# Patient Record
Sex: FEMALE | Race: WHITE | NOT HISPANIC OR LATINO | Employment: FULL TIME | URBAN - METROPOLITAN AREA
[De-identification: names, ages, dates, MRNs, and addresses within clinical notes are randomized per-mention and may not be internally consistent; named-entity substitution may affect disease eponyms.]

---

## 2023-08-28 ENCOUNTER — OFFICE VISIT (OUTPATIENT)
Dept: PRIMARY CARE | Facility: CLINIC | Age: 33
End: 2023-08-28
Payer: COMMERCIAL

## 2023-08-28 ENCOUNTER — LAB (OUTPATIENT)
Dept: LAB | Facility: LAB | Age: 33
End: 2023-08-28
Payer: COMMERCIAL

## 2023-08-28 VITALS
HEIGHT: 66 IN | WEIGHT: 142 LBS | HEART RATE: 80 BPM | RESPIRATION RATE: 16 BRPM | SYSTOLIC BLOOD PRESSURE: 124 MMHG | OXYGEN SATURATION: 98 % | BODY MASS INDEX: 22.82 KG/M2 | DIASTOLIC BLOOD PRESSURE: 80 MMHG

## 2023-08-28 DIAGNOSIS — R42 DIZZY SPELLS: ICD-10-CM

## 2023-08-28 DIAGNOSIS — R42 DIZZY SPELLS: Primary | ICD-10-CM

## 2023-08-28 PROBLEM — M41.25 OTHER IDIOPATHIC SCOLIOSIS, THORACOLUMBAR REGION: Status: ACTIVE | Noted: 2023-08-28

## 2023-08-28 LAB
ALANINE AMINOTRANSFERASE (SGPT) (U/L) IN SER/PLAS: 11 U/L (ref 7–45)
ANION GAP IN SER/PLAS: 15 MMOL/L (ref 10–20)
ASPARTATE AMINOTRANSFERASE (SGOT) (U/L) IN SER/PLAS: 16 U/L (ref 9–39)
CALCIUM (MG/DL) IN SER/PLAS: 10 MG/DL (ref 8.6–10.6)
CARBON DIOXIDE, TOTAL (MMOL/L) IN SER/PLAS: 25 MMOL/L (ref 21–32)
CHLORIDE (MMOL/L) IN SER/PLAS: 104 MMOL/L (ref 98–107)
CREATININE (MG/DL) IN SER/PLAS: 0.61 MG/DL (ref 0.5–1.05)
ERYTHROCYTE DISTRIBUTION WIDTH (RATIO) BY AUTOMATED COUNT: 12.3 % (ref 11.5–14.5)
ERYTHROCYTE MEAN CORPUSCULAR HEMOGLOBIN CONCENTRATION (G/DL) BY AUTOMATED: 32.4 G/DL (ref 32–36)
ERYTHROCYTE MEAN CORPUSCULAR VOLUME (FL) BY AUTOMATED COUNT: 89 FL (ref 80–100)
ERYTHROCYTES (10*6/UL) IN BLOOD BY AUTOMATED COUNT: 4.72 X10E12/L (ref 4–5.2)
ESTIMATED AVERAGE GLUCOSE FOR HBA1C: 91 MG/DL
GFR FEMALE: >90 ML/MIN/1.73M2
GLUCOSE (MG/DL) IN SER/PLAS: 69 MG/DL (ref 74–99)
HEMATOCRIT (%) IN BLOOD BY AUTOMATED COUNT: 42 % (ref 36–46)
HEMOGLOBIN (G/DL) IN BLOOD: 13.6 G/DL (ref 12–16)
HEMOGLOBIN A1C/HEMOGLOBIN TOTAL IN BLOOD: 4.8 %
LEUKOCYTES (10*3/UL) IN BLOOD BY AUTOMATED COUNT: 10.5 X10E9/L (ref 4.4–11.3)
NRBC (PER 100 WBCS) BY AUTOMATED COUNT: 0 /100 WBC (ref 0–0)
PLATELETS (10*3/UL) IN BLOOD AUTOMATED COUNT: 227 X10E9/L (ref 150–450)
POTASSIUM (MMOL/L) IN SER/PLAS: 3.9 MMOL/L (ref 3.5–5.3)
SODIUM (MMOL/L) IN SER/PLAS: 140 MMOL/L (ref 136–145)
THYROTROPIN (MIU/L) IN SER/PLAS BY DETECTION LIMIT <= 0.05 MIU/L: 2.07 MIU/L (ref 0.44–3.98)
UREA NITROGEN (MG/DL) IN SER/PLAS: 12 MG/DL (ref 6–23)

## 2023-08-28 PROCEDURE — 1036F TOBACCO NON-USER: CPT | Performed by: FAMILY MEDICINE

## 2023-08-28 PROCEDURE — 84450 TRANSFERASE (AST) (SGOT): CPT

## 2023-08-28 PROCEDURE — 84443 ASSAY THYROID STIM HORMONE: CPT

## 2023-08-28 PROCEDURE — 99203 OFFICE O/P NEW LOW 30 MIN: CPT | Performed by: FAMILY MEDICINE

## 2023-08-28 PROCEDURE — 80048 BASIC METABOLIC PNL TOTAL CA: CPT

## 2023-08-28 PROCEDURE — 85027 COMPLETE CBC AUTOMATED: CPT

## 2023-08-28 PROCEDURE — 83036 HEMOGLOBIN GLYCOSYLATED A1C: CPT

## 2023-08-28 PROCEDURE — 93000 ELECTROCARDIOGRAM COMPLETE: CPT | Performed by: FAMILY MEDICINE

## 2023-08-28 PROCEDURE — 36415 COLL VENOUS BLD VENIPUNCTURE: CPT

## 2023-08-28 PROCEDURE — 84460 ALANINE AMINO (ALT) (SGPT): CPT

## 2023-08-28 NOTE — PROGRESS NOTES
"Subjective   Patient ID: Olga Christopher is a 33 y.o. female who presents for Dizziness.    HPI  Initial visit.    Reports dizzy spells x 2 months (last episode 1.5 wks ago).  Gets sensation in middle of forehead then down her nose (almost feels like she's going to have a nosebleed) then the dizziness occurs.  Feels like the room is spinning.  Asso w/palpitations, nausea (emesis x 1).  No chest pain, SOB.  Can occur a couple times/day for 2-3 days, then won't occur for a few days.   Had approx 30-40 episodes over the last 2 months.  Not orthostatic, but normally occurs when already standing.  Lasts a few seconds.  Resolves when she sits down.  Had 1 episode while driving.  Not precipitated by turning her head.  Drinks 64-80 oz water/day, 1-2 cans Pepsi/day.  No coffee, tea, alcohol.    Review of Systems  No other complaints.     Objective   /80   Pulse 80   Resp 16   Ht 1.676 m (5' 6\")   Wt 64.4 kg (142 lb)   SpO2 98%   BMI 22.92 kg/m²     Physical Exam  Constitutional:       General: She is not in acute distress.     Appearance: She is normal weight.   HENT:      Head: Normocephalic.      Right Ear: Tympanic membrane normal.      Left Ear: Tympanic membrane normal.      Mouth/Throat:      Pharynx: Oropharynx is clear. No oropharyngeal exudate or posterior oropharyngeal erythema.   Eyes:      Extraocular Movements: Extraocular movements intact.      Conjunctiva/sclera: Conjunctivae normal.      Pupils: Pupils are equal, round, and reactive to light.   Neck:      Vascular: No carotid bruit.   Cardiovascular:      Rate and Rhythm: Normal rate.      Heart sounds: Normal heart sounds. No murmur heard.     No friction rub. No gallop.      Comments: I personally reviewed EKG at time of office visit  Pulmonary:      Effort: Pulmonary effort is normal.      Breath sounds: Normal breath sounds. No wheezing, rhonchi or rales.   Skin:     Coloration: Skin is not jaundiced or pale.   Neurological:      General: No " focal deficit present.      Mental Status: She is oriented to person, place, and time.   Psychiatric:         Mood and Affect: Mood normal.     Assessment/Plan   Diagnoses and all orders for this visit:  Dizzy spells  -     CBC; Future  -     Basic Metabolic Panel; Future  -     Alanine Aminotransferase; Future  -     Aspartate Aminotransferase; Future  -     TSH with reflex to Free T4 if abnormal; Future  -     ECG 12 lead (Clinic Performed)  -     Hemoglobin A1C; Future  -     Transthoracic Echo (TTE) Complete; Future  -     Referral to Neurology; Future  -     Referral to Cardiology; Future    Discussed DDx including cardiac etiologies vs neurological etiologies vs metabolic etiologies.  Nonfasting labs.  ECHO ordered.  Referred to neurology and cardiology.  Recommend ER visit if symptoms return.    Schedule annual wellness visit.

## 2023-08-28 NOTE — PATIENT INSTRUCTIONS
Discussed Ddx including cardiac etiologies vs neurological etiologies vs metabolic etiologies.  Nonfasting labs.  ECHO ordered.  Referred to neurology and cardiology.  Recommend ER visit if symptoms return.    Schedule annual wellness visit.

## 2023-12-04 ENCOUNTER — APPOINTMENT (OUTPATIENT)
Dept: NEUROLOGY | Facility: CLINIC | Age: 33
End: 2023-12-04
Payer: COMMERCIAL

## 2024-01-23 ENCOUNTER — APPOINTMENT (OUTPATIENT)
Dept: NEUROLOGY | Facility: CLINIC | Age: 34
End: 2024-01-23
Payer: COMMERCIAL

## 2024-01-26 ENCOUNTER — HOSPITAL ENCOUNTER (EMERGENCY)
Facility: HOSPITAL | Age: 34
Discharge: HOME | End: 2024-01-26
Attending: EMERGENCY MEDICINE
Payer: COMMERCIAL

## 2024-01-26 ENCOUNTER — APPOINTMENT (OUTPATIENT)
Dept: CARDIOLOGY | Facility: HOSPITAL | Age: 34
End: 2024-01-26
Payer: COMMERCIAL

## 2024-01-26 ENCOUNTER — APPOINTMENT (OUTPATIENT)
Dept: RADIOLOGY | Facility: HOSPITAL | Age: 34
End: 2024-01-26
Payer: COMMERCIAL

## 2024-01-26 VITALS
SYSTOLIC BLOOD PRESSURE: 152 MMHG | TEMPERATURE: 97.7 F | RESPIRATION RATE: 18 BRPM | DIASTOLIC BLOOD PRESSURE: 94 MMHG | OXYGEN SATURATION: 99 % | HEART RATE: 85 BPM

## 2024-01-26 DIAGNOSIS — R42 DIZZINESS: Primary | ICD-10-CM

## 2024-01-26 LAB
ALBUMIN SERPL BCP-MCNC: 4.2 G/DL (ref 3.4–5)
ALP SERPL-CCNC: 58 U/L (ref 33–110)
ALT SERPL W P-5'-P-CCNC: 12 U/L (ref 7–45)
ANION GAP SERPL CALC-SCNC: 11 MMOL/L (ref 10–20)
AST SERPL W P-5'-P-CCNC: 15 U/L (ref 9–39)
BASOPHILS # BLD AUTO: 0.04 X10*3/UL (ref 0–0.1)
BASOPHILS NFR BLD AUTO: 0.5 %
BILIRUB SERPL-MCNC: 0.3 MG/DL (ref 0–1.2)
BUN SERPL-MCNC: 15 MG/DL (ref 6–23)
CALCIUM SERPL-MCNC: 9.5 MG/DL (ref 8.6–10.3)
CHLORIDE SERPL-SCNC: 107 MMOL/L (ref 98–107)
CO2 SERPL-SCNC: 27 MMOL/L (ref 21–32)
CREAT SERPL-MCNC: 0.64 MG/DL (ref 0.5–1.05)
EGFRCR SERPLBLD CKD-EPI 2021: >90 ML/MIN/1.73M*2
EOSINOPHIL # BLD AUTO: 0.17 X10*3/UL (ref 0–0.7)
EOSINOPHIL NFR BLD AUTO: 2.3 %
ERYTHROCYTE [DISTWIDTH] IN BLOOD BY AUTOMATED COUNT: 12.4 % (ref 11.5–14.5)
GLUCOSE SERPL-MCNC: 86 MG/DL (ref 74–99)
HCT VFR BLD AUTO: 38 % (ref 36–46)
HGB BLD-MCNC: 12.8 G/DL (ref 12–16)
IMM GRANULOCYTES # BLD AUTO: 0.02 X10*3/UL (ref 0–0.7)
IMM GRANULOCYTES NFR BLD AUTO: 0.3 % (ref 0–0.9)
INR PPP: 1.1 (ref 0.9–1.1)
LYMPHOCYTES # BLD AUTO: 2.21 X10*3/UL (ref 1.2–4.8)
LYMPHOCYTES NFR BLD AUTO: 30.2 %
MCH RBC QN AUTO: 29.3 PG (ref 26–34)
MCHC RBC AUTO-ENTMCNC: 33.7 G/DL (ref 32–36)
MCV RBC AUTO: 87 FL (ref 80–100)
MONOCYTES # BLD AUTO: 0.44 X10*3/UL (ref 0.1–1)
MONOCYTES NFR BLD AUTO: 6 %
NEUTROPHILS # BLD AUTO: 4.44 X10*3/UL (ref 1.2–7.7)
NEUTROPHILS NFR BLD AUTO: 60.7 %
NRBC BLD-RTO: 0 /100 WBCS (ref 0–0)
PLATELET # BLD AUTO: 217 X10*3/UL (ref 150–450)
POTASSIUM SERPL-SCNC: 3.6 MMOL/L (ref 3.5–5.3)
PROT SERPL-MCNC: 6.9 G/DL (ref 6.4–8.2)
PROTHROMBIN TIME: 12.5 SECONDS (ref 9.8–12.8)
RBC # BLD AUTO: 4.37 X10*6/UL (ref 4–5.2)
SODIUM SERPL-SCNC: 141 MMOL/L (ref 136–145)
WBC # BLD AUTO: 7.3 X10*3/UL (ref 4.4–11.3)

## 2024-01-26 PROCEDURE — 80053 COMPREHEN METABOLIC PANEL: CPT | Performed by: EMERGENCY MEDICINE

## 2024-01-26 PROCEDURE — 96361 HYDRATE IV INFUSION ADD-ON: CPT

## 2024-01-26 PROCEDURE — 85025 COMPLETE CBC W/AUTO DIFF WBC: CPT | Performed by: EMERGENCY MEDICINE

## 2024-01-26 PROCEDURE — 36415 COLL VENOUS BLD VENIPUNCTURE: CPT | Performed by: EMERGENCY MEDICINE

## 2024-01-26 PROCEDURE — 85610 PROTHROMBIN TIME: CPT | Performed by: EMERGENCY MEDICINE

## 2024-01-26 PROCEDURE — 70450 CT HEAD/BRAIN W/O DYE: CPT | Performed by: RADIOLOGY

## 2024-01-26 PROCEDURE — 93005 ELECTROCARDIOGRAM TRACING: CPT

## 2024-01-26 PROCEDURE — 2500000004 HC RX 250 GENERAL PHARMACY W/ HCPCS (ALT 636 FOR OP/ED): Performed by: EMERGENCY MEDICINE

## 2024-01-26 PROCEDURE — 70450 CT HEAD/BRAIN W/O DYE: CPT

## 2024-01-26 PROCEDURE — 96360 HYDRATION IV INFUSION INIT: CPT

## 2024-01-26 PROCEDURE — 99285 EMERGENCY DEPT VISIT HI MDM: CPT

## 2024-01-26 PROCEDURE — 99284 EMERGENCY DEPT VISIT MOD MDM: CPT | Performed by: EMERGENCY MEDICINE

## 2024-01-26 RX ORDER — ONDANSETRON HYDROCHLORIDE 2 MG/ML
4 INJECTION, SOLUTION INTRAVENOUS ONCE
Status: DISCONTINUED | OUTPATIENT
Start: 2024-01-26 | End: 2024-01-27 | Stop reason: HOSPADM

## 2024-01-26 RX ADMIN — SODIUM CHLORIDE, POTASSIUM CHLORIDE, SODIUM LACTATE AND CALCIUM CHLORIDE 1000 ML: 600; 310; 30; 20 INJECTION, SOLUTION INTRAVENOUS at 20:29

## 2024-01-26 ASSESSMENT — COLUMBIA-SUICIDE SEVERITY RATING SCALE - C-SSRS
2. HAVE YOU ACTUALLY HAD ANY THOUGHTS OF KILLING YOURSELF?: NO
6. HAVE YOU EVER DONE ANYTHING, STARTED TO DO ANYTHING, OR PREPARED TO DO ANYTHING TO END YOUR LIFE?: NO
1. IN THE PAST MONTH, HAVE YOU WISHED YOU WERE DEAD OR WISHED YOU COULD GO TO SLEEP AND NOT WAKE UP?: NO

## 2024-01-26 ASSESSMENT — PAIN SCALES - GENERAL: PAINLEVEL_OUTOF10: 0 - NO PAIN

## 2024-01-26 ASSESSMENT — PAIN - FUNCTIONAL ASSESSMENT: PAIN_FUNCTIONAL_ASSESSMENT: 0-10

## 2024-01-26 NOTE — ED TRIAGE NOTES
Pt arrives via triage with complaint of intermittent dizziness, nausea, and lethargy happening over the last couple of months. Denies CP/SOB, st was seen at PCP and referred pt to neurologist but pt cannot get into an appt.

## 2024-01-27 NOTE — SIGNIFICANT EVENT
Called late evening on 1/26/24 by Dr. Herrera regarding Ms. Christopher. I did not see or examine the patient. History obtained from ED provider. Briefly, she is a 34 y/o woman who presented with a 6 month history of dizziness. Described as vertigo, with room spinning sensation, lasting seconds to minutes with spontaneous resolution. Usually with head movement. Sometimes multiple episodes per day. Sometimes with 2 weeks or so of freedom. No associated focal weakness or numbness, headache or neck pain. The patients symptoms had resolved and she is back to her neurologic baseline.     CTH without any evidence of acute intracranial pathology. I was informed that she cannot get an MRI Brain as she has hardware that is not compatible. I told Dr. Herrera that the patient could either stay until I may examine her in the AM, or, given the above, she could follow up with Neurology and Vestibular Rehab if Dr. Herrera deemed safe for discharge. Patient requested to follow up with Neurology as outpatient.     Idris Hurst, DO  Neurology

## 2024-01-27 NOTE — ED PROVIDER NOTES
History/Exam limitations: none.   Additional history was obtained from patient and spouse/SO.          HPI:    Olga Christopher is a 33 y.o. female no significant past medical history presenting with episodic dizziness times approximately 6 months.  Patient states that she had an episode today while riding in a car and she states she felt markedly dizzy and was unable to open her eyes.  States she was still talking through the episode and had no loss conscious.  States that she feels as if the room is moving when the episodes occur.  States that she feels that she is going to fall over as well and has to sit down.  States that they last anywhere from a few seconds to a minute.  States that she has sometimes multiple per day and sometimes she will go a week or 2 without an episode.  She has not been able to find any triggers.  Denies taking any medications.  Has been nauseous and had vomiting with episodes but does not always.  Had an episode while was evaluating her in the ED and she retain consciousness, had no nystagmus, no discoordination.  It resolved after approximate minute.  Heart rate was normal in approximately the 60s to 70s during the episode.  Patient denies any chest pain, shortness breath or palpitations during the episodes.  No focal weakness or numbness.  Has not noticed any clear discoordination.  Denies any family history of any neurologic conditions.  No headache or neck pain.  Episodes are not positional.    Patient states she is unable to get an MRI due to having hardware in her back from her prior spine surgery that is not compatible          Physical Exam:  ED Triage Vitals   Temperature Heart Rate Respirations BP   01/26/24 1743 01/26/24 1743 01/26/24 1743 01/26/24 1743   36.5 °C (97.7 °F) 85 18 (!) 152/94      Pulse Ox Temp src Heart Rate Source Patient Position   01/26/24 1744 -- -- --   99 %         BP Location FiO2 (%)     -- --              GEN:      Alert, NAD  Eyes:       PERRL,  EOMI  HENT:      NC/AT, OP clear, airway patent, MM  CV:      RRR, no MRG, no LE pitting edema, 2+ radial and pedal pulses  PULM:     CTAB, no w/r/r, easy WOB, symmetric chest rise  ABD:      Soft, NT, ND, no masses, BS +  :       No CVA TTP  NEURO:   A/Ox4, CN II-XII intact, strength 5/5 in all 4 ext, SILT, FNF normal b/l, no pronator drift, no nystagmus, negative test of skew, no truncal instability, heel shin normal bilaterally  MSK:      FROM, no joint deformities or swelling, no e/o trauma  SKIN:       Warm and dry  PSYCH:    Appropriate mood and affect         MDM/ED Course:   Olga Christopher is a 33 y.o. female no significant past medical history presenting with episodic dizziness times approximately 6 months.  Vitals and exam as documented above.  Patient is currently free of dizziness therefore hints exam was not performed.  Patient has had episodic symptoms over the last 6 months of varying frequency and intensity.  Unclear etiology for symptoms, differential includes peripheral vertigo, intracranial mass, posterior CVA (less likely given time course, recurrent symptoms, history, risk factors), focal seizure, atypical migraine, demyelinating process.  Differential includes arrhythmia, vasovagal episodes.  Symptoms are not related to position change and patient is not clinically orthostatic on exam.  Patient denies any tinnitus or other hearing symptoms.  IV was placed and labs drawn.  Patient was given a liter of IV fluids.  CBC CMP and coags reassuring.  CT head was obtained without acute findings.  While assessing patient she did have an episode that lasted approximately 1 minute.  As above patient not tachycardic and had no discernible neurologic exam findings.  Consulted Dr. Hurst from neurology regarding patient and recommends outpatient follow-up with neurology.  MRI would be a consideration however patient unable to obtain 1 as above.  Discussed whether vessel imaging would be helpful at this time  and recommended deferring.  Recommend continued monitoring of symptoms, outpatient follow-up with neurology, possible vestibular rehab.  Patient states she is been trying to get an appointment with neurology but has been canceled as she was told that dizziness was not a reason for an appointment.  A new consult order request was placed.  Patient feels comfortable with this plan.  Patient was explained findings, exam/study results, the importance of follow up and the plan moving forward; patient verbalized understanding and agreement. All questions were answered and no new questions at this time. Patient will be discharged with strict return precautions, follow up plan with PCP/neurology.     You El: 5:43 PM normal sinus rhythm, sinus arrhythmia, rate 76, normal axis, normal intervals, no STEMI, no ectopy, no prior for comparison, no signs of Brugada/WPW/epsilon wave/prolonged QTc.    Diagnoses as of 01/29/24 1406   Dizziness         Chronic medical conditions affecting care listed in MDM. I independently reviewed imaging studies and agreed with radiology reads. I reviewed recent and relevant outside records including PCP notes, Prior discharge summaries, and prior radiology reports.    Procedure  Procedures    Diagnosis:   1.  Episodic dizziness    Dispo: Discharged in stable condition      Disclaimer: Portions of this note were dictated by speech recognition. An attempt at proof reading was made to minimize errors. Minor errors in transcription may be present.  Please call if questions.     Piter Herrera MD  01/29/24 1406

## 2024-01-27 NOTE — DISCHARGE INSTRUCTIONS
You are seen emergency room today for evaluation of episodic dizziness over the last 6 months.  Your CT scan and labs were overall reassuring.  Your exam was reassuring as well.  Please follow-up with neurology as discussed.  Please also follow-up with your primary care provider.  Please return for worsening symptoms including limited to worsening dizziness, any weakness, numbness, difficulty with coordination, headache, neck pain, or if you have any other concerns.

## 2024-01-29 LAB
ATRIAL RATE: 76 BPM
P AXIS: 49 DEGREES
P OFFSET: 197 MS
P ONSET: 153 MS
PR INTERVAL: 138 MS
Q ONSET: 222 MS
QRS COUNT: 12 BEATS
QRS DURATION: 72 MS
QT INTERVAL: 380 MS
QTC CALCULATION(BAZETT): 427 MS
QTC FREDERICIA: 411 MS
R AXIS: 31 DEGREES
T AXIS: 45 DEGREES
T OFFSET: 412 MS
VENTRICULAR RATE: 76 BPM

## 2024-02-01 ENCOUNTER — OFFICE VISIT (OUTPATIENT)
Dept: NEUROLOGY | Facility: CLINIC | Age: 34
End: 2024-02-01
Payer: COMMERCIAL

## 2024-02-01 VITALS
WEIGHT: 153.2 LBS | SYSTOLIC BLOOD PRESSURE: 134 MMHG | BODY MASS INDEX: 24.73 KG/M2 | HEART RATE: 76 BPM | DIASTOLIC BLOOD PRESSURE: 93 MMHG

## 2024-02-01 DIAGNOSIS — R42 VERTIGO: Primary | ICD-10-CM

## 2024-02-01 PROCEDURE — 99205 OFFICE O/P NEW HI 60 MIN: CPT | Performed by: PSYCHIATRY & NEUROLOGY

## 2024-02-01 PROCEDURE — 1036F TOBACCO NON-USER: CPT | Performed by: PSYCHIATRY & NEUROLOGY

## 2024-02-01 NOTE — PROGRESS NOTES
Consultation:  Krystina     Olga Christopher is a 33 y.o. year old female here for a new visit for dizziness.  Referred by Piter Herrera.     HPI  She had several vertigo spells since August.  She went to the ED  for a severe episode.    She was riding in a car on  and felt very dizzy , room spinning sensation.    She has had intermittent vertigo.  Sometimes nausea and vomiting.   She feels everything around her is spinning.  No trigger. No lightheadedness.   No headaches.  No double vision.   No tinnitus , no ear pain.   FH: father has heart issues, mother has breast cancer. Father has DM.   Has trouble with sweats.   No weight changes.  No palpitations.  No head injuries.  Has not seen ENT.   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  Last week - Lost vision in r eye x 20 minutes then went away.  FH: sister has migraines  Her and her  are trying to have a baby.  Review of Systems    Patient Active Problem List   Diagnosis    Other idiopathic scoliosis, thoracolumbar region     Past Medical History:   Diagnosis Date    Complete or unspecified spontaneous  without complication 2020    , spontaneous complete    Encounter for  screening, unspecified 2021    Encounter for  screening    Encounter for pregnancy test, result positive 2021    Pregnancy test positive    Encounter for screening for infections with a predominantly sexual mode of transmission 02/15/2021    Screening for STD (sexually transmitted disease)    Encounter for supervision of normal pregnancy, unspecified, third trimester 2021    Third trimester pregnancy    Encounter for supervision of other normal pregnancy, first trimester 2021    Multigravida in first trimester    Encounter for supervision of other normal pregnancy, second trimester 06/10/2021    Multigravida in second trimester    Encounter for supervision of other normal pregnancy,  third trimester 2021    Multigravida in third trimester    Maternal care for other known or suspected poor fetal growth, unspecified trimester, not applicable or unspecified 2021    SGA (small for gestational age), fetal, affecting care of mother, antepartum     small for gestational age, unspecified weight 2021    SGA (small for gestational age)    Other specified abnormal findings of blood chemistry 2020    Abnormal thyroid screen (blood)    Other specified pregnancy related conditions, unspecified trimester 2021    Pain of round ligament during pregnancy    Pelvic and perineal pain 02/15/2021    Pelvic pain in female    Personal history of other complications of pregnancy, childbirth and the puerperium 2021    H/O: 1 miscarriage    Personal history of other diseases of the female genital tract 02/15/2021    History of vaginal discharge    Personal history of other diseases of the musculoskeletal system and connective tissue     History of scoliosis    Pregnancy with inconclusive fetal viability, not applicable or unspecified 2021    Encounter to determine fetal viability of pregnancy     Past Surgical History:   Procedure Laterality Date    SPINE SURGERY  2017    Scoliosis     Social History     Tobacco Use    Smoking status: Never    Smokeless tobacco: Never   Substance Use Topics    Alcohol use: Not Currently     family history includes Breast cancer in her mother; Diabetes in her father; Hypertension in her father; Stroke in her father; Valvular heart disease in her father.  No current outpatient medications on file.  No Known Allergies  BP (!) 134/93   Pulse 76   Wt 69.5 kg (153 lb 3.2 oz)   LMP 2024   BMI 24.73 kg/m²   Neurological Exam/Physical Exam:    Constitutional: General appearance: no acute distress. Pleasant.   Auscultation of Heart: Regular rate and rhythm, no murmurs, normal S1 and S2.   Carotid Arteries: no bruits  Peripheral  Vascular Exam: No swelling, edema or tenderness to palpation in extremities  Mental status: no distress, alert, interactive and cooperative. Affect is appropriate.   Orientation: oriented to person, oriented to place and oriented to time.   Memory: recent memory intact and remote memory intact.   Attention: normal attention /concentration.   Language: normal comprehension and naming.   Fund of knowledge: Patient displays adequate knowledge of current events.  Eyes: The ophthalmoscopic examination was normal.   Cranial nerve II: Visual fields full to confrontation.   Cranial nerves III, IV, and VI: Pupils round, equally reactive to light; EOMs intact. No nystagmus.   Cranial Nerve V: Facial sensation intact to LT bilaterally.   Cranial nerve VII: no facial droop  Cranial nerve VIII: Hearing is intact  Cranial nerves IX and X: Palate elevates symmetrically.   Cranial nerve XI: Shoulder shrug intact.   Cranial nerve XII: Tongue is midline.  Motor:  Muscle bulk was normal in both upper and lower extremities.    No atrophy.   Strength is normal.   Deep Tendon Reflexes: left biceps 2+ , right biceps 2+, left triceps 2+, right triceps 2+, left brachioradialis 2+, right brachioradialis 2+, left patella 2+, right patella 2+, left ankle jerk 2+, right ankle jerk 2+   Plantar Reflex: Toes downgoing to plantar stimulation on the left. Toes downgoing to plantar stimulation on the right.   Sensory Exam: Normal to vibratory sensation  Coordination:  no limb dystaxia and rapid alternating movements are intact.   Gait:  cautious.         Labs:  CBC:   Lab Results   Component Value Date    WBC 7.3 01/26/2024    HGB 12.8 01/26/2024    HCT 38.0 01/26/2024     01/26/2024     BMP:   Lab Results   Component Value Date     01/26/2024    K 3.6 01/26/2024     01/26/2024    CO2 27 01/26/2024    BUN 15 01/26/2024    CREATININE 0.64 01/26/2024    CALCIUM 9.5 01/26/2024     LFT:   Lab Results   Component Value Date    ALKPHOS  58 01/26/2024    BILITOT 0.3 01/26/2024    PROT 6.9 01/26/2024    ALBUMIN 4.2 01/26/2024    ALT 12 01/26/2024    AST 15 01/26/2024       CT head 1/26 normal.     Assessment/Plan   Problem List Items Addressed This Visit    None   Vertigo - ? Vertiginous migraine vs inner ear vs demyelinating disease vs aneurysm. Does not appear to be dysautonomia / vasovagal but is on differential.   Cannot have MRI brain due to metal duncan in spine.  Obtain cta head and neck.  Vestibular therapy.  Keep log of these. Trial riboflavin/ magnesium supplements.  See ENT to rule out any peripheral cause. Obtain ENG testing.

## 2024-02-01 NOTE — PATIENT INSTRUCTIONS
"It was a pleasure seeing you today.     I want you to get a CT angio head and neck- Please call radiology to schedule at [phone].   If the results are abnormal, I will call you to discuss.    I want you to get ENG testing , referral to ENT and vestibular rehab.     There are good alternative medication and over-the-counter strategies to help migraines:     You can try riboflavin (vitamin B2) 200 mg twice a day as a natural migraine preventive. This may take several weeks to become fully effective.  You can try magnesium 400 mg once daily as a natural migraine preventive. If it causes loose bowel movements, reduce the dose to 200 mg.    For future appointments I recommend you schedule a follow-up appointment with general neurology.    For any urgent issues or needing to speak to a medical assistant please call [phone], option 6 during our office hours Monday-Friday 8am-4pm, and leave a voicemail with your concern.  My office will try to reach back you as soon as possible within 24 (business) hours.  If you have an emergency please call 911 or visit a local urgent care or nearest emergency room.      Please understand that [employer] is a useful communication tool for simple \"normal\" results or a refill request but I would not recommend using this tool for emergent or urgent issues or for conversations with me.  I am happy to ask my staff to rearrange a follow up visit or a virtual visit sooner than requested if appropriate for your care.     "

## 2024-02-12 ENCOUNTER — APPOINTMENT (OUTPATIENT)
Dept: PRIMARY CARE | Facility: CLINIC | Age: 34
End: 2024-02-12
Payer: COMMERCIAL

## 2024-02-15 ENCOUNTER — APPOINTMENT (OUTPATIENT)
Dept: RADIOLOGY | Facility: CLINIC | Age: 34
End: 2024-02-15
Payer: COMMERCIAL

## 2024-02-19 ENCOUNTER — LAB (OUTPATIENT)
Dept: LAB | Facility: LAB | Age: 34
End: 2024-02-19
Payer: COMMERCIAL

## 2024-02-19 DIAGNOSIS — R42 VERTIGO: ICD-10-CM

## 2024-02-19 LAB — HCG UR QL IA.RAPID: POSITIVE

## 2024-02-19 PROCEDURE — 81025 URINE PREGNANCY TEST: CPT

## 2024-02-20 ENCOUNTER — TELEPHONE (OUTPATIENT)
Dept: OBSTETRICS AND GYNECOLOGY | Facility: CLINIC | Age: 34
End: 2024-02-20
Payer: COMMERCIAL

## 2024-02-20 DIAGNOSIS — O36.80X0 PREGNANCY WITH INCONCLUSIVE FETAL VIABILITY (HHS-HCC): ICD-10-CM

## 2024-02-21 NOTE — TELEPHONE ENCOUNTER
Patient called back and wanted to inform you that she has had miscarriages in the past, and that she is also RH neg. I let her know that you will be reaching out to her if not this week then next week to touch base with her.

## 2024-02-22 ENCOUNTER — OFFICE VISIT (OUTPATIENT)
Dept: PRIMARY CARE | Facility: CLINIC | Age: 34
End: 2024-02-22
Payer: COMMERCIAL

## 2024-02-22 ENCOUNTER — APPOINTMENT (OUTPATIENT)
Dept: RADIOLOGY | Facility: CLINIC | Age: 34
End: 2024-02-22
Payer: COMMERCIAL

## 2024-02-22 VITALS
DIASTOLIC BLOOD PRESSURE: 80 MMHG | SYSTOLIC BLOOD PRESSURE: 132 MMHG | WEIGHT: 155.6 LBS | HEIGHT: 66 IN | BODY MASS INDEX: 25.01 KG/M2 | TEMPERATURE: 97.6 F | HEART RATE: 85 BPM | OXYGEN SATURATION: 97 % | RESPIRATION RATE: 16 BRPM

## 2024-02-22 DIAGNOSIS — R42 DIZZY SPELLS: Primary | ICD-10-CM

## 2024-02-22 PROCEDURE — 1036F TOBACCO NON-USER: CPT | Performed by: FAMILY MEDICINE

## 2024-02-22 PROCEDURE — 99213 OFFICE O/P EST LOW 20 MIN: CPT | Performed by: FAMILY MEDICINE

## 2024-02-22 NOTE — PROGRESS NOTES
"Subjective   Patient ID: Olga Christopher is a 33 y.o. female who presents for Hospital Follow-up (ED visit ).    HPI   Seen at ER 1/26/24 for intermittent dizziness x 6 months.  IVF provided, labs obtained, CT head obtained.  Advised to f/u w/neuro.  Saw neuro 2/1/24.  CTA head/neck ordered (scheduled for today but had to cancel because she found out that she is pregnant, has upcoming appt w/OB).  Referred to vestibular therapy.  Advised to see ENT to r/o peripheral causes (has appt 3/14/24).  Had 3 episodes of dizziness (all in the same day) since neuro visit.    Otherwise no complaints.     Review of Systems  No other complaints.     Objective   /80   Pulse 85   Temp 36.4 °C (97.6 °F)   Resp 16   Ht 1.676 m (5' 6\")   Wt 70.6 kg (155 lb 9.6 oz)   LMP 01/25/2024   SpO2 97%   BMI 25.11 kg/m²     Physical Exam  Constitutional:       General: She is not in acute distress.     Appearance: Normal appearance.   Neurological:      Mental Status: She is oriented to person, place, and time.   Psychiatric:         Mood and Affect: Mood normal.         Behavior: Behavior normal.     Assessment/Plan   Diagnoses and all orders for this visit:  Dizzy spells    Follow up with specialists and vestibular therapy as directed.    Schedule annual wellness visit.   "

## 2024-02-22 NOTE — PATIENT INSTRUCTIONS
Follow up with specialists and vestibular therapy as directed.    Schedule annual wellness visit.

## 2024-02-27 NOTE — TELEPHONE ENCOUNTER
Spoke with patient. . With LMP 24. She found out she was pregnant when she was going to get a CT scan and had a positive pregnancy test. Patient aware of [facility] delivery location. Discussed the use of Rhogam during pregnancy and when it is given. Will order OB ultrasound and office will call and schedule appointment with patient.

## 2024-02-29 ENCOUNTER — CLINICAL SUPPORT (OUTPATIENT)
Dept: AUDIOLOGY | Facility: CLINIC | Age: 34
End: 2024-02-29
Payer: COMMERCIAL

## 2024-02-29 DIAGNOSIS — R42 DIZZINESS: Primary | ICD-10-CM

## 2024-02-29 DIAGNOSIS — R42 VERTIGO: ICD-10-CM

## 2024-02-29 PROCEDURE — 92700 UNLISTED ORL SERVICE/PX: CPT | Mod: 59

## 2024-02-29 PROCEDURE — 92537 CALORIC VSTBLR TEST W/REC: CPT

## 2024-02-29 PROCEDURE — 92540 BASIC VESTIBULAR EVALUATION: CPT

## 2024-02-29 PROCEDURE — 92550 TYMPANOMETRY & REFLEX THRESH: CPT

## 2024-02-29 PROCEDURE — 92519 VEMP TST I&R CERVICAL&OCULAR: CPT

## 2024-02-29 PROCEDURE — 92557 COMPREHENSIVE HEARING TEST: CPT

## 2024-02-29 NOTE — PROGRESS NOTES
AUDIOMETRIC EVALUATION      Name: Olga Christopher  : 1990  Age: 33 y.o.  Date of Evaluation: 2024  Time: 2907-3158    HISTORY     Olga Christopher, age 33, was seen today for an initial hearing evaluation in conjunction with a balance functional test. Please see same day VNG note for full vestibular case history. Audiologically, Olga reported that she hears well and has no present concerns for her hearing. She denied any ear pain, pressure, fullness, tinnitus, ear infection or drainage, or ear trauma.    PROCEDURE     Otoscopy:   Right Ear: Clear ear canal with visible tympanic membrane.  Left Ear: Clear ear canal with visible tympanic membrane.    Tympanometry: 226 Hz probe tone  Right Ear: Tympanometry revealed normal ear canal volume, peak pressure, and compliance, consistent with normal middle ear function (Type A).  Left Ear: Tympanometry revealed normal ear canal volume, peak pressure, and compliance, consistent with normal middle ear function (Type A).     Ipsilateral Acoustic Reflexes:   Right Ear: Ipsilateral Acoustic Reflexes were present 500-4000 Hz.  Left Ear: Ipsilateral Acoustic Reflexes were present 500-4000 Hz.    Pure Tone Audiometry: Conventional Audiometry via TDH headphones with good reliability  Right Ear: Normal hearing sensitivity at all tested frequencies from 125- 8000 Hz.   Left Ear: Normal hearing sensitivity at all tested frequencies from 125- 8000 Hz.     Speech Audiometry:   Right Ear:    Speech reception threshold: 5 dB HL  Word Recognition Score was 100% when presented at 55 dB HL. Recorded NU6 list utilized.   Left Ear:   Speech reception threshold: 5 dB HL  Word Recognition Score was 100% when presented at  55 dB HL. Recorded NU6 list utilized.     IMPRESSIONS AND RECOMMENDATIONS      Results were discussed with patient. Results indicate bilateral mobile and intact tympanic membranes, normal hearing in the right ear, and normal hearing in the left ear.     Please see full  impressions and recommendations under same day VNG note.     TREATMENT PLAN     Retest hearing if concerns arise.  Follow up with medical providers as indicated.      JAZZ Eden under the supervision of Kacie Moser CCC-A CCKacie Young, CCC-A CCVR  Senior Clinical Vestibular Audiologist

## 2024-02-29 NOTE — PATIENT INSTRUCTIONS
BALANCE FUNCTION TEST (BFT)  AFTER VISIT SUMMARY      TESTING SUMMARY     The purpose of today's testing was to evaluate for any vestibular system (inner ear) involvement to account for your symptoms of dizziness/imbalance. Deep inside each of your ears, there are 5 balance organs which contribute to your ability to maintain balance and reduce dizziness. Our vestibular system involves 3 semicircular canals (“spinning detectors”) and 2 otolith organs (“gravity sensors”).    IMPRESSIONS     Based on today's evaluation, your vestibular system appears to be weakened on both sides and possibly contributing as a source for your symptoms.    RECOMMENDATIONS     Continue medical follow up with Dr. Rodríguez.   Consider further vestibular physical therapy to address vestibular loss.   Consider re-evaluation as medically indicated.  Maintain a healthy lifestyle to help body function overall.    Testing and interpretation of results completed by DELANO Eden and Kacie Moser CCC-A CCTHERESE. It was our pleasure to evaluate this patient.       Kacie Moser, CCC-A CCVR  Senior Clinical Vestibular Audiologist

## 2024-02-29 NOTE — PROGRESS NOTES
"ADULT BALANCE FUNCTION TEST (BFT)      Name:  Olga Christopher  :  1990  Age:  33 y.o.  Date of Evaluation:  2024     IMPRESSIONS     Suspected bilateral peripheral vestibular involvement given the followin) overall low total SPV for both left and right caloric irrigations, 2) right covert saccades in the right posterior canal, and 3) borderline abnormal asymmetry (right weaker) oVEMP ratio. The vestibular system appears to be compensated physiologically and uncompensated functionally. While caloric irrigations are indicative of bilateral involvement, interpret results with caution as rotational chair testing is considered the \"gold standard\" for diagnosis of bilateral involvement.    There were no indications of central vestibular system pathway involvement. There were no indications of active Benign Paroxysmal Positional Vertigo (BPPV) present.  Normal observation of gait and transfers. Bedside postural control findings demonstrate normal function balance with varying sensory information measured on the mCTIB. Patient's risk of falling based on today's evaluation is low.    RECOMMENDATIONS     Consider re-evaluation as medically indicated.  Consider vestibular physical therapy to address uncompensated bilateral vestibulopathy. Emphasis on sensory substitution exercises to account for inadequate vestibular input, gaze stabilization exercises for VOR deficiencies, habituation exercises to triggers, and fall risk prevention.  Maintain a healthy lifestyle to help body function overall.  Continue monitoring per ENT/PCP preference.    Time: 9863-8235    HISTORY     Patient was seen for Balance Function Testing (BFT) due to a history of dizziness/imbalance. Vestibular case history collected via patient-clinician interview, patient chart review, and patient questionnaires.    Patient reported history of dizziness described as spinning vertigo.  Symptoms began suddenly in 2023. Patient went to emergency " "services following the initial episode.   Episodes occur from daily to weekly and last several seconds before subsiding.  Most recent episode occurred two weeks ago.  Associated symptoms include increased body temperature, nausea, and emesis.  Patient could not identify provoking factors.  Symptoms are alleviated by sitting down.  Overall the patient's symptoms have remained consistent over time.  This patient has had a total of 0 falls due to their symptoms.   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  Denied any symptoms provoked by sneezing or coughing, as well as any presence of autophony.   Denied any recent medication changes.   Patient reported complying with pre-test instructions. No significant neck pain or restriction which would contraindicate portions of testing today.    Most recent audiologic evaluation performed on 2/29/2024  by JAZZ Eden under the supervision of Kacie Moser CCC-A CCVR  revealed normal hearing with excellent word recognition, bilaterally. Tympanometry revealed normal eardrum mobility and canal volume, bilaterally.    EVALUATION     See VNG, vHIT, & VEMP Raw Data in \"Media\"    TEST RESULTS     BEDSIDE ASSESSMENT TEST  The bedside assessment is an optional portion of the test battery to further assist in differential diagnosis and screen for eye abnormalities which may affect testing.    Otoscopy: unremarkable.  Extra-Ocular Range of Motion: normal. Extra-Ocular Range of Motion is performed to evaluate any eye abnormalities prior to testing.  Cover/Uncover: normal. Cover/Uncover test is performed to evaluate for skewed deviation of the eyes prior to testing.  Cervical Neck Exam: normal. Cervical Neck is performed to evaluate any restrictions or pain with neck movement prior to testing.  Vertebral Artery Screen: normal. Vertebral Artery Screen is performed to evaluate for vertebrobasilar insufficiency prior to testing.   Ocular Counter-Roll: normal. Ocular Counter-Roll is " performed to evaluate ocular tilt reaction and assess otolith organ function prior to testing.  VOR Cancellation: normal. VOR Cancellation is performed to evaluate fixation suppression prior to testing.  Modified Clinical Test of Sensory Interaction on Balance (mCTSIB): normal. mCTSIB is performed to evaluate balance with varying sensory information.      VIDEONYSTAGMOGRAPHY (VNG) TEST  VNG provides objective indications of peripheral and central vestibulo-ocular pathway involvement. Ocular motor testing to visually guided targets is conducted using a dual channel video-recording technique for the recording of eye movement in the horizontal and vertical planes. Air caloric testing is performed at 48 degrees C and 24 degrees C.    Spontaneous Nystagmus test was absent. Spontaneous nystagmus testing may help with the identification of an acute or uncompensated peripheral vestibular lesion.   Gaze Nystagmus test was normal. Gaze nystagmus testing is to evaluate for nystagmus that is evoked by holding eye gaze in any particular direction. True gaze nystagmus is amplified when vision is denied.   Random Saccades test was normal. Random saccade testing is to evaluate patient's ability to make fast random eye movements along a horizontal moving target.   Smooth Pursuit/Tracking test was normal. Smooth pursuit/tracking testing is to evaluate the ability to move eyes with a single smoothly moving target.   Optokinetic nystagmus testing was normal. This full-field OPK test is to evaluate the ability of central nervous system to stabilize vision during sustained head movement after the VOR system loses effectiveness.   [place]-Hallpike testing was normal. [place]-Hallpike testing is to provide a diagnosis of Benign Paroxysmal Positional Vertigo (BPPV) of the vertical semicircular canals on the side which is most affected.  Roll testing was normal. Roll testing is to provide a diagnosis of Benign Paroxysmal Positional Vertigo (BPPV) of  the horizontal semicircular canals on the side which is most affected.  Positional testing was normal. Positional testing is to evaluate patient's ability to hold a steady gaze while in different positions.  Bithermal caloric testing was abnormal. Total SPV values for the right and left ear were 7 d/s and 8 d/s, respectively. Indicative of bilateral involvement. Caloric testing is to evaluate for peripheral vestibular lesion.      VIDEO HEAD IMPULSE TEST (vHIT)  The vHIT procedure provides objective assessment of the high frequency vestibulo-ocular reflex (VOR) for each semicircular canal. Rapid, random horizontal and vertical thrusts are applied to the patient's head to provoke the VOR. The vHIT procedure includes two separate paradigms: Head Impulse Paradigm (HIMP) and Suppression Head Impulse Paradigm (SHIMP). SHIMP is an optional paradigm that is not appropriate to perform for every patient. However, it is appropriate to perform SHIMP when there is verified evidence of possible vestibulopathy in the traditional HIMP test.     Head Impulse Paradigm (HIMP)   Right Ear   Canal Gain Overt Saccades Covert Saccades   Lateral 0.96 absent absent   Anterior 1.13 absent absent   Posterior 0.94 absent possibly present        Head Impulse Paradigm (HIMP)   Left Ear   Canal Gain Overt Saccades Covert Saccades   Lateral 0.96 absent absent   Anterior 0.82 absent absent   Posterior 1.02 absent absent     Total gain for all canals tested were within normal limits (<0.80 is abnormal for lateral, <0.70 is abnormal for vertical).  Covert saccades were present in the right posterior canal. Interpret with caution given small amplitude of saccades.    CERVICAL VESTIBULAR EVOKED MYOGENIC POTENTIALS (cVEMP):  The cVEMP procedure is an evoked potential used to test the saccule and its afferent pathway. An asymmetry ratio is utilized to determine side of lesion. The cVEMP was recorded with the patient cervical extension to produce  isolated contraction of the ipsilateral sternocleidomastoid (SCM) muscle. The cVEMP was recorded using a 500 Hz tone burst or 1000 Hz tone burst at a rate of 5.1.      Ear Presentation Level Amplitude P1 Latency N1 Latency  Amplitude Asymmetry Ratio   Right 100 dB nHL 104.4 µV 17.67 ms 22.33 ms 10%   Left 100 dB nHL 86.00 µV 16.33 ms 24.67 ms       Replicable cVEMP responses were within normal limits, bilaterally. The amplitude asymmetry ratio of 10% was not significant (>33% = abnormal).     Superior Canal Dehiscence Screening (75 dB nHL): Negative bilaterally        OCULAR VESTIBULAR EVOKED MYOGENIC POTENTIALS (oVEMP)  The oVEMP procedure is an evoked potential used to test the utricle and its afferent pathway. An asymmetry ratio is utilized to determine side of lesion. This is a contralateral recording. The oVEMP was recorded with the patient seated upright with eyes tilted upward to produce isolated contraction of the contralateral inferior oblique muscle. The oVEMP were recorded using a 500 Hz, 2000 Hz, 4000 Hz tone burst at a rate of 5.1.       Ear Presentation Level Amplitude N1 Latency  P1 Latency  Amplitude Asymmetry Ratio   Right 95 dB nHL 3.91 µV 7.33 ms 10.00 ms 30%   Left 95 dB nHL 7.25 µV 11.00 ms 17.33 ms       Replicable oVEMP responses were recorded, bilaterally. The amplitude asymmetry ratio of 30% was borderline abnormal (>33% = abnormal).     Meniere's Disease Screening (2000 Hz): Negative bilaterally  Superior Canal Dehiscence Screening (4000 Hz): Negative bilaterally    Testing and interpretation of results completed by DELANO Eden and Kacie Moser CCC-A CC. It was our pleasure to evaluate this patient.       Kacie Moser, CCC-A CCVR  Senior Clinical Vestibular Audiologist

## 2024-03-13 PROBLEM — R92.8 ABNORMAL FINDING ON BREAST IMAGING: Status: ACTIVE | Noted: 2024-03-13

## 2024-03-13 PROBLEM — R42 DIZZY SPELLS: Status: ACTIVE | Noted: 2023-08-28

## 2024-03-13 PROBLEM — N83.8 COMPLEX CYST OF UTERINE ADNEXA: Status: ACTIVE | Noted: 2024-03-13

## 2024-03-13 PROBLEM — Z3A.39 39 WEEKS GESTATION OF PREGNANCY (HHS-HCC): Status: ACTIVE | Noted: 2024-03-13

## 2024-03-13 PROBLEM — O36.8130 DECREASED FETAL MOVEMENTS IN THIRD TRIMESTER (HHS-HCC): Status: ACTIVE | Noted: 2024-03-13

## 2024-03-13 PROBLEM — O26.899 PAIN OF ROUND LIGAMENT DURING PREGNANCY (HHS-HCC): Status: ACTIVE | Noted: 2024-03-13

## 2024-03-13 PROBLEM — N89.8 VAGINAL DISCHARGE: Status: ACTIVE | Noted: 2024-03-13

## 2024-03-13 PROBLEM — O03.9 COMPLETE SPONTANEOUS ABORTION (HHS-HCC): Status: ACTIVE | Noted: 2024-03-13

## 2024-03-13 PROBLEM — R10.2 PAIN OF ROUND LIGAMENT DURING PREGNANCY (HHS-HCC): Status: ACTIVE | Noted: 2024-03-13

## 2024-03-13 PROBLEM — N63.0 MASS OF BREAST: Status: ACTIVE | Noted: 2024-03-13

## 2024-03-13 PROBLEM — G43.109 MIGRAINE AURA OCCURRING WITH AND WITHOUT HEADACHE: Status: ACTIVE | Noted: 2024-03-13

## 2024-03-13 PROBLEM — O36.5990 INTRAUTERINE GROWTH RESTRICTION (IUGR) AFFECTING CARE OF MOTHER (HHS-HCC): Status: ACTIVE | Noted: 2024-03-13

## 2024-03-13 RX ORDER — NORETHINDRONE ACETATE AND ETHINYL ESTRADIOL 1.5-30(21)
KIT ORAL
COMMUNITY
Start: 2017-03-02 | End: 2024-03-29 | Stop reason: ALTCHOICE

## 2024-03-14 ENCOUNTER — OFFICE VISIT (OUTPATIENT)
Dept: OTOLARYNGOLOGY | Facility: CLINIC | Age: 34
End: 2024-03-14
Payer: COMMERCIAL

## 2024-03-14 VITALS
HEIGHT: 67 IN | SYSTOLIC BLOOD PRESSURE: 111 MMHG | TEMPERATURE: 98 F | BODY MASS INDEX: 24.64 KG/M2 | HEART RATE: 79 BPM | WEIGHT: 157 LBS | DIASTOLIC BLOOD PRESSURE: 73 MMHG

## 2024-03-14 DIAGNOSIS — R42 DIZZINESS: ICD-10-CM

## 2024-03-14 DIAGNOSIS — H81.8X9 UNILATERAL VESTIBULAR WEAKNESS, UNSPECIFIED LATERALITY: ICD-10-CM

## 2024-03-14 DIAGNOSIS — R42 VERTIGO: Primary | ICD-10-CM

## 2024-03-14 PROCEDURE — 99214 OFFICE O/P EST MOD 30 MIN: CPT | Performed by: NURSE PRACTITIONER

## 2024-03-14 PROCEDURE — 99204 OFFICE O/P NEW MOD 45 MIN: CPT | Performed by: NURSE PRACTITIONER

## 2024-03-14 PROCEDURE — 1036F TOBACCO NON-USER: CPT | Performed by: NURSE PRACTITIONER

## 2024-03-14 ASSESSMENT — LIFESTYLE VARIABLES
HOW OFTEN DO YOU HAVE A DRINK CONTAINING ALCOHOL: NEVER
HOW OFTEN DO YOU HAVE SIX OR MORE DRINKS ON ONE OCCASION: NEVER
HOW MANY STANDARD DRINKS CONTAINING ALCOHOL DO YOU HAVE ON A TYPICAL DAY: PATIENT DOES NOT DRINK
AUDIT-C TOTAL SCORE: 0
SKIP TO QUESTIONS 9-10: 1

## 2024-03-14 ASSESSMENT — ENCOUNTER SYMPTOMS
DEPRESSION: 0
OCCASIONAL FEELINGS OF UNSTEADINESS: 0
LOSS OF SENSATION IN FEET: 0

## 2024-03-14 ASSESSMENT — PATIENT HEALTH QUESTIONNAIRE - PHQ9
2. FEELING DOWN, DEPRESSED OR HOPELESS: NOT AT ALL
SUM OF ALL RESPONSES TO PHQ9 QUESTIONS 1 AND 2: 0
1. LITTLE INTEREST OR PLEASURE IN DOING THINGS: NOT AT ALL

## 2024-03-14 ASSESSMENT — COLUMBIA-SUICIDE SEVERITY RATING SCALE - C-SSRS
1. IN THE PAST MONTH, HAVE YOU WISHED YOU WERE DEAD OR WISHED YOU COULD GO TO SLEEP AND NOT WAKE UP?: NO
2. HAVE YOU ACTUALLY HAD ANY THOUGHTS OF KILLING YOURSELF?: NO
6. HAVE YOU EVER DONE ANYTHING, STARTED TO DO ANYTHING, OR PREPARED TO DO ANYTHING TO END YOUR LIFE?: NO

## 2024-03-14 ASSESSMENT — PAIN SCALES - GENERAL: PAINLEVEL: 0-NO PAIN

## 2024-03-19 ENCOUNTER — HOSPITAL ENCOUNTER (OUTPATIENT)
Dept: RADIOLOGY | Facility: CLINIC | Age: 34
Discharge: HOME | End: 2024-03-19
Payer: COMMERCIAL

## 2024-03-19 DIAGNOSIS — O36.80X0 PREGNANCY WITH INCONCLUSIVE FETAL VIABILITY (HHS-HCC): ICD-10-CM

## 2024-03-19 PROCEDURE — 76801 OB US < 14 WKS SINGLE FETUS: CPT

## 2024-03-19 PROCEDURE — 76801 OB US < 14 WKS SINGLE FETUS: CPT | Performed by: OBSTETRICS & GYNECOLOGY

## 2024-03-29 ENCOUNTER — INITIAL PRENATAL (OUTPATIENT)
Dept: OBSTETRICS AND GYNECOLOGY | Facility: CLINIC | Age: 34
End: 2024-03-29
Payer: COMMERCIAL

## 2024-03-29 VITALS — BODY MASS INDEX: 24.59 KG/M2 | DIASTOLIC BLOOD PRESSURE: 62 MMHG | WEIGHT: 157 LBS | SYSTOLIC BLOOD PRESSURE: 118 MMHG

## 2024-03-29 DIAGNOSIS — Z3A.09 9 WEEKS GESTATION OF PREGNANCY (HHS-HCC): Primary | ICD-10-CM

## 2024-03-29 DIAGNOSIS — Z36.9 ANTENATAL SCREENING ENCOUNTER (HHS-HCC): ICD-10-CM

## 2024-03-29 PROBLEM — R10.2 PAIN OF ROUND LIGAMENT DURING PREGNANCY (HHS-HCC): Status: RESOLVED | Noted: 2024-03-13 | Resolved: 2024-03-29

## 2024-03-29 PROBLEM — O36.5990 INTRAUTERINE GROWTH RESTRICTION (IUGR) AFFECTING CARE OF MOTHER (HHS-HCC): Status: RESOLVED | Noted: 2024-03-13 | Resolved: 2024-03-29

## 2024-03-29 PROBLEM — O26.899 PAIN OF ROUND LIGAMENT DURING PREGNANCY (HHS-HCC): Status: RESOLVED | Noted: 2024-03-13 | Resolved: 2024-03-29

## 2024-03-29 PROBLEM — Z3A.39 39 WEEKS GESTATION OF PREGNANCY (HHS-HCC): Status: RESOLVED | Noted: 2024-03-13 | Resolved: 2024-03-29

## 2024-03-29 PROBLEM — O09.621 YOUNG MULTIGRAVIDA IN FIRST TRIMESTER (HHS-HCC): Status: ACTIVE | Noted: 2024-03-29

## 2024-03-29 PROBLEM — N89.8 VAGINAL DISCHARGE: Status: RESOLVED | Noted: 2024-03-13 | Resolved: 2024-03-29

## 2024-03-29 PROBLEM — O36.8130 DECREASED FETAL MOVEMENTS IN THIRD TRIMESTER (HHS-HCC): Status: RESOLVED | Noted: 2024-03-13 | Resolved: 2024-03-29

## 2024-03-29 PROCEDURE — 88142 CYTOPATH C/V THIN LAYER: CPT

## 2024-03-29 PROCEDURE — 0500F INITIAL PRENATAL CARE VISIT: CPT | Performed by: OBSTETRICS & GYNECOLOGY

## 2024-03-29 PROCEDURE — 87086 URINE CULTURE/COLONY COUNT: CPT

## 2024-03-29 PROCEDURE — 87624 HPV HI-RISK TYP POOLED RSLT: CPT

## 2024-03-29 PROCEDURE — 87800 DETECT AGNT MULT DNA DIREC: CPT

## 2024-03-29 NOTE — PROGRESS NOTES
Subjective   Patient ID [id number]   Olga Christopher is a 34 y.o.  at 9w2d with a working estimated date of delivery of 10/30/2024, by Last Menstrual Period who presents for an initial prenatal visit. This pregnancy is planned.    Her pregnancy is complicated by:  Vestibular dysfunction with chronic dizziness     OB History    Para Term  AB Living   3 1 1   1 1   SAB IAB Ectopic Multiple Live Births   1       1      # Outcome Date GA Lbr Bradley/2nd Weight Sex Delivery Anes PTL Lv   3 Current            2 Term 21 39w0d  3.43 kg M Vag-Spont EPI N HOLLIE   1 SAB  6w0d    Complete             Objective   Physical Exam  Weight: 71.2 kg (157 lb)  Expected Total Weight Gain: 11.5 kg (25 lb)-16 kg (35 lb)   Pregravid BMI: 23.49  BP: 118/62          Physical Exam  Constitutional:       Appearance: Normal appearance.   Genitourinary:      Vulva normal.   HENT:      Head: Normocephalic.      Nose: Nose normal.   Eyes:      Conjunctiva/sclera: Conjunctivae normal.      Pupils: Pupils are equal, round, and reactive to light.   Cardiovascular:      Rate and Rhythm: Normal rate and regular rhythm.      Pulses: Normal pulses.      Heart sounds: Normal heart sounds.   Pulmonary:      Effort: Pulmonary effort is normal.      Breath sounds: Normal breath sounds.   Abdominal:      General: Abdomen is flat.      Palpations: Abdomen is soft.   Musculoskeletal:         General: Normal range of motion.      Cervical back: Normal range of motion and neck supple.   Neurological:      General: No focal deficit present.      Mental Status: She is alert and oriented to person, place, and time.   Skin:     General: Skin is warm.   Psychiatric:         Mood and Affect: Mood normal.         Behavior: Behavior normal.         Thought Content: Thought content normal.         Judgment: Judgment normal.   Vitals reviewed.         Prenatal Labs  ordered    Assessment/Plan   Problem List Items Addressed This Visit              ICD-10-CM    9 weeks gestation of pregnancy - Primary Z3A.09     Other Visit Diagnoses         Codes     screening encounter     Z36.9    Relevant Orders    CBC Anemia Panel With Reflex,Pregnancy    Hepatitis B Surface Antigen    HIV 1/2 Antigen/Antibody Screen with Reflex to Confirmation    Rubella Antibody, IgG    Syphilis Screen with Reflex    Type And Screen    Myriad Prequel Prenatal Screen    THINPREP PAP    Urine culture            Immunizations:   Prenatal Labs ordered  Daily prenatal vitamins prescribed  First trimester screening and second trimester screening discussed. Patient decided to proceed with NIPS  Follow up in 4 weeks for return OB visit.

## 2024-03-31 LAB — BACTERIA UR CULT: NORMAL

## 2024-04-02 LAB
C TRACH RRNA SPEC QL NAA+PROBE: NEGATIVE
N GONORRHOEA DNA SPEC QL PROBE+SIG AMP: NEGATIVE

## 2024-04-03 ENCOUNTER — LAB (OUTPATIENT)
Dept: LAB | Facility: LAB | Age: 34
End: 2024-04-03
Payer: COMMERCIAL

## 2024-04-03 DIAGNOSIS — Z36.9 ANTENATAL SCREENING ENCOUNTER (HHS-HCC): ICD-10-CM

## 2024-04-03 LAB
ABO GROUP (TYPE) IN BLOOD: NORMAL
ANTIBODY SCREEN: NORMAL
ERYTHROCYTE [DISTWIDTH] IN BLOOD BY AUTOMATED COUNT: 12.5 % (ref 11.5–14.5)
HBV SURFACE AG SERPL QL IA: NONREACTIVE
HCT VFR BLD AUTO: 40.2 % (ref 36–46)
HGB BLD-MCNC: 13 G/DL (ref 12–16)
HIV 1+2 AB+HIV1 P24 AG SERPL QL IA: NONREACTIVE
MCH RBC QN AUTO: 28.7 PG (ref 26–34)
MCHC RBC AUTO-ENTMCNC: 32.3 G/DL (ref 32–36)
MCV RBC AUTO: 89 FL (ref 80–100)
NRBC BLD-RTO: 0 /100 WBCS (ref 0–0)
PLATELET # BLD AUTO: 193 X10*3/UL (ref 150–450)
RBC # BLD AUTO: 4.53 X10*6/UL (ref 4–5.2)
REFLEX ADDED, ANEMIA PANEL: NORMAL
RH FACTOR (ANTIGEN D): NORMAL
RUBV IGG SERPL IA-ACNC: 0.6 IA
RUBV IGG SERPL QL IA: NEGATIVE
TREPONEMA PALLIDUM IGG+IGM AB [PRESENCE] IN SERUM OR PLASMA BY IMMUNOASSAY: NONREACTIVE
WBC # BLD AUTO: 8.1 X10*3/UL (ref 4.4–11.3)

## 2024-04-03 PROCEDURE — 85027 COMPLETE CBC AUTOMATED: CPT

## 2024-04-03 PROCEDURE — 36415 COLL VENOUS BLD VENIPUNCTURE: CPT

## 2024-04-03 PROCEDURE — 86317 IMMUNOASSAY INFECTIOUS AGENT: CPT

## 2024-04-03 PROCEDURE — 87340 HEPATITIS B SURFACE AG IA: CPT

## 2024-04-03 PROCEDURE — 86850 RBC ANTIBODY SCREEN: CPT

## 2024-04-03 PROCEDURE — 87389 HIV-1 AG W/HIV-1&-2 AB AG IA: CPT

## 2024-04-03 PROCEDURE — 86780 TREPONEMA PALLIDUM: CPT

## 2024-04-03 PROCEDURE — 86901 BLOOD TYPING SEROLOGIC RH(D): CPT

## 2024-04-03 PROCEDURE — 86900 BLOOD TYPING SEROLOGIC ABO: CPT

## 2024-04-04 PROBLEM — Z67.91 RH NEGATIVE STATE IN ANTEPARTUM PERIOD (HHS-HCC): Status: ACTIVE | Noted: 2024-04-04

## 2024-04-04 PROBLEM — O26.899 RH NEGATIVE STATE IN ANTEPARTUM PERIOD (HHS-HCC): Status: ACTIVE | Noted: 2024-04-04

## 2024-04-08 LAB
CYTOLOGY CMNT CVX/VAG CYTO-IMP: NORMAL
HPV HR 12 DNA GENITAL QL NAA+PROBE: NEGATIVE
HPV HR GENOTYPES PNL CVX NAA+PROBE: NEGATIVE
HPV16 DNA SPEC QL NAA+PROBE: NEGATIVE
HPV18 DNA SPEC QL NAA+PROBE: NEGATIVE
LAB AP HPV GENOTYPE QUESTION: YES
LAB AP HPV HR: NORMAL
LAB AP PAP ADDITIONAL TESTS: NORMAL
LABORATORY COMMENT REPORT: NORMAL
LABORATORY COMMENT REPORT: NORMAL
LMP START DATE: NORMAL
MENSTRUAL HX REPORTED: NORMAL
PATH REPORT.TOTAL CANCER: NORMAL

## 2024-04-18 LAB — SCAN RESULT: NORMAL

## 2024-04-23 ENCOUNTER — HOSPITAL ENCOUNTER (OUTPATIENT)
Dept: RADIOLOGY | Facility: CLINIC | Age: 34
Discharge: HOME | End: 2024-04-23
Payer: COMMERCIAL

## 2024-04-23 DIAGNOSIS — O36.80X0 PREGNANCY WITH INCONCLUSIVE FETAL VIABILITY (HHS-HCC): ICD-10-CM

## 2024-04-23 DIAGNOSIS — O26.891 OTHER SPECIFIED PREGNANCY RELATED CONDITIONS, FIRST TRIMESTER (HHS-HCC): ICD-10-CM

## 2024-04-23 PROCEDURE — 76813 OB US NUCHAL MEAS 1 GEST: CPT

## 2024-04-23 PROCEDURE — 76813 OB US NUCHAL MEAS 1 GEST: CPT | Performed by: OBSTETRICS & GYNECOLOGY

## 2024-05-02 PROBLEM — Z3A.14 14 WEEKS GESTATION OF PREGNANCY (HHS-HCC): Status: ACTIVE | Noted: 2024-03-29

## 2024-05-02 PROBLEM — O09.622 YOUNG MULTIGRAVIDA IN SECOND TRIMESTER (HHS-HCC): Status: ACTIVE | Noted: 2024-03-29

## 2024-05-02 NOTE — PROGRESS NOTES
"Subjective   Patient ID [id number]   Olga Christopher is a 34 y.o.  at 14w2d with a working estimated date of delivery of 10/30/2024, by Last Menstrual Period who presents for a routine prenatal visit. She denies vaginal bleeding, leakage of fluid, decreased fetal movements, or contractions.    Her pregnancy is complicated by:  Vestibular dysfunction with chronic dizziness , Rh negative status       Objective   Physical Exam     Expected Total Weight Gain: 11.5 kg (25 lb)-16 kg (35 lb)   Pregravid BMI: 23.49            Prenatal Labs  Urine dip:  No results found for: \"KETONESU\", \"GLUCOSEUR\", \"LEUKOCYTESUR\"    Lab Results   Component Value Date    HGB 13.0 2024    HCT 40.2 2024    ABO O 2024    HEPBSAG Nonreactive 2024     No results found for: \"PAPPA\", \"AFP\", \"HCG\", \"ESTRIOL\", \"INHBA\"  No results found for: \"GLUF\", \"GLUT1\", \"BBAVSBK3MH\", \"[id number]\"    Imaging  The most recent ultrasound was performed on The most recent ultrasound study is not finalized with a study GA of The most recent ultrasound study is not finalized and EFW of The most recent ultrasound study is not finalized.  The most recent ultrasound study is not finalized  The most recent ultrasound study is not finalized    Assessment/Plan   Problem List Items Addressed This Visit             ICD-10-CM    Rh negative state in antepartum period ([facility]) O26.899, Z67.91    14 weeks gestation of pregnancy ([facility]) - Primary Z3A.14    Young multigravida in second trimester ([facility]) O09.622       Continue prenatal vitamin.  Labs reviewed.  Follow up in 4 weeks for a routine prenatal visit  .  "

## 2024-05-03 ENCOUNTER — ROUTINE PRENATAL (OUTPATIENT)
Dept: OBSTETRICS AND GYNECOLOGY | Facility: CLINIC | Age: 34
End: 2024-05-03
Payer: COMMERCIAL

## 2024-05-03 VITALS — BODY MASS INDEX: 24.12 KG/M2 | WEIGHT: 154 LBS | DIASTOLIC BLOOD PRESSURE: 82 MMHG | SYSTOLIC BLOOD PRESSURE: 112 MMHG

## 2024-05-03 DIAGNOSIS — O09.622 YOUNG MULTIGRAVIDA IN SECOND TRIMESTER (HHS-HCC): ICD-10-CM

## 2024-05-03 DIAGNOSIS — Z36.9 ANTENATAL SCREENING ENCOUNTER (HHS-HCC): ICD-10-CM

## 2024-05-03 DIAGNOSIS — Z3A.14 14 WEEKS GESTATION OF PREGNANCY (HHS-HCC): Primary | ICD-10-CM

## 2024-05-03 DIAGNOSIS — O26.899 RH NEGATIVE STATE IN ANTEPARTUM PERIOD (HHS-HCC): ICD-10-CM

## 2024-05-03 DIAGNOSIS — Z67.91 RH NEGATIVE STATE IN ANTEPARTUM PERIOD (HHS-HCC): ICD-10-CM

## 2024-05-03 PROCEDURE — 0501F PRENATAL FLOW SHEET: CPT | Performed by: OBSTETRICS & GYNECOLOGY

## 2024-05-20 ENCOUNTER — TELEPHONE (OUTPATIENT)
Dept: OBSTETRICS AND GYNECOLOGY | Facility: CLINIC | Age: 34
End: 2024-05-20
Payer: COMMERCIAL

## 2024-05-20 NOTE — TELEPHONE ENCOUNTER
----- Message from Natalie Padilla sent at 5/20/2024 12:28 PM EDT -----  Regarding: blood work  She was just seen and was told that she needed to get bood work done but does not remember what weeks. She walso wanted to verify that the orders are in.

## 2024-05-28 ENCOUNTER — TELEPHONE (OUTPATIENT)
Dept: OBSTETRICS AND GYNECOLOGY | Facility: CLINIC | Age: 34
End: 2024-05-28
Payer: COMMERCIAL

## 2024-05-28 ENCOUNTER — LAB (OUTPATIENT)
Dept: LAB | Facility: LAB | Age: 34
End: 2024-05-28
Payer: COMMERCIAL

## 2024-05-28 DIAGNOSIS — Z36.9 ANTENATAL SCREENING ENCOUNTER (HHS-HCC): ICD-10-CM

## 2024-05-28 PROCEDURE — 82105 ALPHA-FETOPROTEIN SERUM: CPT

## 2024-05-28 PROCEDURE — 36415 COLL VENOUS BLD VENIPUNCTURE: CPT

## 2024-05-28 NOTE — TELEPHONE ENCOUNTER
Patient called in to office. She works in a childcare facility and there is 1 confirmed case of Fifths Disease and 4 others pending.   Patient wants to know if she needs to get he blood work that tests for it.

## 2024-05-30 ENCOUNTER — ROUTINE PRENATAL (OUTPATIENT)
Dept: OBSTETRICS AND GYNECOLOGY | Facility: CLINIC | Age: 34
End: 2024-05-30
Payer: COMMERCIAL

## 2024-05-30 VITALS — BODY MASS INDEX: 25.06 KG/M2 | SYSTOLIC BLOOD PRESSURE: 108 MMHG | DIASTOLIC BLOOD PRESSURE: 78 MMHG | WEIGHT: 160 LBS

## 2024-05-30 DIAGNOSIS — H81.93 VESTIBULAR DYSFUNCTION OF BOTH EARS: ICD-10-CM

## 2024-05-30 DIAGNOSIS — Z3A.18 18 WEEKS GESTATION OF PREGNANCY (HHS-HCC): Primary | ICD-10-CM

## 2024-05-30 DIAGNOSIS — Z20.828 EXPOSURE TO PARVOVIRUS: ICD-10-CM

## 2024-05-30 PROBLEM — H81.90 VESTIBULAR DYSFUNCTION: Status: ACTIVE | Noted: 2024-05-30

## 2024-05-30 PROCEDURE — 0501F PRENATAL FLOW SHEET: CPT | Performed by: OBSTETRICS & GYNECOLOGY

## 2024-05-30 NOTE — PROGRESS NOTES
"Subjective   Patient ID [id number]   Olga Christopher is a 34 y.o.  at 18w1d with a working estimated date of delivery of 10/30/2024, by Last Menstrual Period who presents for a routine prenatal visit. She denies vaginal bleeding, leakage of fluid, decreased fetal movements, or contractions.  Complains of episodes of dizziness off-and-on, due to vestibular dysfunction    Her pregnancy is complicated by:  Vestibular dysfunction with chronic dizziness     Objective   Physical Exam  Weight: 72.6 kg (160 lb)  Expected Total Weight Gain: 11.5 kg (25 lb)-16 kg (35 lb)   Pregravid BMI: 23.49  BP: 108/78  Fetal Heart Rate: 140 Fundal Height (cm): 18 cm    Prenatal Labs  Urine dip:  No results found for: \"KETONESU\", \"GLUCOSEUR\", \"LEUKOCYTESUR\"    Lab Results   Component Value Date    HGB 13.0 2024    HCT 40.2 2024    ABO O 2024    HEPBSAG Nonreactive 2024     No results found for: \"PAPPA\", \"AFP\", \"HCG\", \"ESTRIOL\", \"INHBA\"  No results found for: \"GLUF\", \"GLUT1\", \"[id number]\", \"[id number]\"    Imaging  The most recent ultrasound was performed on The most recent ultrasound study is not finalized with a study GA of The most recent ultrasound study is not finalized and EFW of The most recent ultrasound study is not finalized.  The most recent ultrasound study is not finalized  The most recent ultrasound study is not finalized    Assessment/Plan   Problem List Items Addressed This Visit             ICD-10-CM    18 weeks gestation of pregnancy ([facility]) - Primary Z3A.18    Vestibular dysfunction H81.90     Other Visit Diagnoses         Codes    Exposure to parvovirus     Z20.828    Relevant Orders    Parvovirus B19 Antibody, IgG IgM          Continue prenatal vitamin.  Exposed to Fifth disease, will order Labs, Parvo virus B19 IgM and IgG ordered  Vestibular dysfunction, she will follow-up with ENT  Follow up in 4 weeks for a routine prenatal visit.  "

## 2024-06-03 ENCOUNTER — LAB (OUTPATIENT)
Dept: LAB | Facility: LAB | Age: 34
End: 2024-06-03
Payer: COMMERCIAL

## 2024-06-03 DIAGNOSIS — Z20.828 EXPOSURE TO PARVOVIRUS: ICD-10-CM

## 2024-06-03 LAB
AFP INTERP SERPL-IMP: NORMAL
AFP INTERP SERPL-IMP: NORMAL
AFP MOM SERPL: 0.71
AFP SERPL-MCNC: 30.4 NG/ML
AGE AT DELIVERY: 34.6 YR
COMMENT,AFP MATERNAL SERUM: NORMAL
GA METHOD: NORMAL
GA: 17.9 WEEKS
IDDM PATIENT QL: NO
MULTIPLE PREGNANCY: NO
NEURAL TUBE DEFECT RISK FETUS: NORMAL %
RESULTS, AFP MATERNAL SERUM: NORMAL

## 2024-06-03 PROCEDURE — 86747 PARVOVIRUS ANTIBODY: CPT

## 2024-06-03 PROCEDURE — 36415 COLL VENOUS BLD VENIPUNCTURE: CPT

## 2024-06-06 ENCOUNTER — HOSPITAL ENCOUNTER (OUTPATIENT)
Dept: RADIOLOGY | Facility: CLINIC | Age: 34
Discharge: HOME | End: 2024-06-06
Payer: COMMERCIAL

## 2024-06-06 DIAGNOSIS — O36.80X0 PREGNANCY WITH INCONCLUSIVE FETAL VIABILITY (HHS-HCC): ICD-10-CM

## 2024-06-06 PROCEDURE — 76805 OB US >/= 14 WKS SNGL FETUS: CPT

## 2024-06-06 PROCEDURE — 76805 OB US >/= 14 WKS SNGL FETUS: CPT | Performed by: STUDENT IN AN ORGANIZED HEALTH CARE EDUCATION/TRAINING PROGRAM

## 2024-06-08 LAB
B19V IGG SER IA-ACNC: 3.03 IV
B19V IGM SER IA-ACNC: 0.2 IV

## 2024-06-12 ENCOUNTER — APPOINTMENT (OUTPATIENT)
Dept: RADIOLOGY | Facility: CLINIC | Age: 34
End: 2024-06-12
Payer: COMMERCIAL

## 2024-06-19 ENCOUNTER — HOSPITAL ENCOUNTER (OUTPATIENT)
Facility: HOSPITAL | Age: 34
End: 2024-06-19
Attending: OBSTETRICS & GYNECOLOGY | Admitting: OBSTETRICS & GYNECOLOGY
Payer: COMMERCIAL

## 2024-06-19 ENCOUNTER — HOSPITAL ENCOUNTER (OUTPATIENT)
Facility: HOSPITAL | Age: 34
Discharge: HOME | End: 2024-06-19
Attending: OBSTETRICS & GYNECOLOGY | Admitting: OBSTETRICS & GYNECOLOGY
Payer: COMMERCIAL

## 2024-06-19 ENCOUNTER — HOSPITAL ENCOUNTER (EMERGENCY)
Facility: HOSPITAL | Age: 34
Discharge: OTHER INSTITUTION | End: 2024-06-19
Payer: COMMERCIAL

## 2024-06-19 VITALS
OXYGEN SATURATION: 99 % | RESPIRATION RATE: 18 BRPM | HEIGHT: 67 IN | TEMPERATURE: 97.7 F | WEIGHT: 162.26 LBS | HEART RATE: 96 BPM | BODY MASS INDEX: 25.47 KG/M2 | SYSTOLIC BLOOD PRESSURE: 134 MMHG | DIASTOLIC BLOOD PRESSURE: 73 MMHG

## 2024-06-19 DIAGNOSIS — O36.80X0 PREGNANCY WITH INCONCLUSIVE FETAL VIABILITY (HHS-HCC): Primary | ICD-10-CM

## 2024-06-19 LAB
APTT PPP: 30 SECONDS (ref 27–38)
FETAL MATERNAL SCREEN: NORMAL
FETAL RBC/RBC BLD FC-RTO: 0.02 %
FIBRINOGEN PPP-MCNC: 397 MG/DL (ref 200–400)
INR PPP: 1 (ref 0.9–1.1)
PROTHROMBIN TIME: 11.3 SECONDS (ref 9.8–12.8)

## 2024-06-19 PROCEDURE — 99214 OFFICE O/P EST MOD 30 MIN: CPT | Mod: 25

## 2024-06-19 PROCEDURE — 99203 OFFICE O/P NEW LOW 30 MIN: CPT | Performed by: OBSTETRICS & GYNECOLOGY

## 2024-06-19 PROCEDURE — 88184 FLOWCYTOMETRY/ TC 1 MARKER: CPT | Mod: TC,AHULAB | Performed by: OBSTETRICS & GYNECOLOGY

## 2024-06-19 PROCEDURE — 85610 PROTHROMBIN TIME: CPT | Performed by: OBSTETRICS & GYNECOLOGY

## 2024-06-19 PROCEDURE — 96372 THER/PROPH/DIAG INJ SC/IM: CPT | Performed by: OBSTETRICS & GYNECOLOGY

## 2024-06-19 PROCEDURE — 4500999001 HC ED NO CHARGE: Performed by: EMERGENCY MEDICINE

## 2024-06-19 PROCEDURE — 36415 COLL VENOUS BLD VENIPUNCTURE: CPT | Performed by: OBSTETRICS & GYNECOLOGY

## 2024-06-19 PROCEDURE — 2500000004 HC RX 250 GENERAL PHARMACY W/ HCPCS (ALT 636 FOR OP/ED): Performed by: OBSTETRICS & GYNECOLOGY

## 2024-06-19 PROCEDURE — 85384 FIBRINOGEN ACTIVITY: CPT | Performed by: OBSTETRICS & GYNECOLOGY

## 2024-06-19 PROCEDURE — 85461 HEMOGLOBIN FETAL: CPT

## 2024-06-19 SDOH — HEALTH STABILITY: MENTAL HEALTH: NON-SPECIFIC ACTIVE SUICIDAL THOUGHTS (PAST 1 MONTH): NO

## 2024-06-19 SDOH — SOCIAL STABILITY: SOCIAL INSECURITY: HAVE YOU HAD THOUGHTS OF HARMING ANYONE ELSE?: NO

## 2024-06-19 SDOH — SOCIAL STABILITY: SOCIAL INSECURITY: HAVE YOU HAD ANY THOUGHTS OF HARMING ANYONE ELSE?: UNABLE TO ASSESS

## 2024-06-19 SDOH — HEALTH STABILITY: MENTAL HEALTH: WERE YOU ABLE TO COMPLETE ALL THE BEHAVIORAL HEALTH SCREENINGS?: YES

## 2024-06-19 SDOH — HEALTH STABILITY: MENTAL HEALTH: SUICIDAL BEHAVIOR (LIFETIME): NO

## 2024-06-19 SDOH — HEALTH STABILITY: MENTAL HEALTH: WISH TO BE DEAD (PAST 1 MONTH): NO

## 2024-06-19 SDOH — SOCIAL STABILITY: SOCIAL INSECURITY: ABUSE SCREEN: ADULT

## 2024-06-19 ASSESSMENT — LIFESTYLE VARIABLES
SKIP TO QUESTIONS 9-10: 1
AUDIT-C TOTAL SCORE: 0
HOW OFTEN DO YOU HAVE A DRINK CONTAINING ALCOHOL: NEVER
HOW OFTEN DO YOU HAVE 6 OR MORE DRINKS ON ONE OCCASION: NEVER
HOW MANY STANDARD DRINKS CONTAINING ALCOHOL DO YOU HAVE ON A TYPICAL DAY: PATIENT DOES NOT DRINK
AUDIT-C TOTAL SCORE: 0

## 2024-06-19 ASSESSMENT — PAIN SCALES - GENERAL: PAINLEVEL_OUTOF10: 0 - NO PAIN

## 2024-06-19 ASSESSMENT — PATIENT HEALTH QUESTIONNAIRE - PHQ9
SUM OF ALL RESPONSES TO PHQ9 QUESTIONS 1 & 2: 0
1. LITTLE INTEREST OR PLEASURE IN DOING THINGS: NOT AT ALL
2. FEELING DOWN, DEPRESSED OR HOPELESS: NOT AT ALL

## 2024-06-19 NOTE — PROGRESS NOTES
"Obstetrical Triage Note    Reason for Triage Observation: Vaginal Bleeding    Assessment   vaginal bleeding  Triage Testing revealed normal coags, fibrinogen, maternal-fetal screen and BSUS   Patient's complaints have resolved.    Plan   Discharge home.     Subjective   History of Present Pregnancy  Olga Christopher is a 34 y.o.  gravid, female. Patient's last menstrual period was 2024. with an Estimated Date of Delivery of 10/30/2024, by Last Menstrual Period, who is now 21w0d gestation. The patient's blood type is O NEG.   Triage Course:  Pt had bloodwork and a BSUS which were reassuring. She has had minimal spotting since arrival and is feeling fetal movement.  An ultrasound has been scheduled for tomorrow morning at [facility].    Objective   Recent Vital Signs:                                 /73   Pulse 96   Temp 36.5 °C (97.7 °F) (Temporal)   Resp 18   Ht 1.702 m (5' 7\")   Wt 73.6 kg (162 lb 4.1 oz)   BMI 25.41 kg/m²     BP & Temp Min/Max Last 24 Hours:     BP  Min: 134/73   Min taken time: 24 0730  Max: 134/73   Max taken time: 24 0730  Temp  Av.5 °C (97.7 °F)  Min: 36.5 °C (97.7 °F)   Min taken time: 24 0730  Max: 36.5 °C (97.7 °F)   Max taken time: 24 0730    Physical Examination:  GENERAL: Examination reveals a well developed, well nourished, gravid female in no acute distress. She is alert and cooperative.    Lab Review:   Labs in chart were reviewed.  "

## 2024-06-19 NOTE — DISCHARGE INSTRUCTIONS
Keep appointment scheduled for 6/20/2024 at 0945 at [facility] [address]. Please come approx 15 minutes early.  Call or return to triage with any painful cramping, increased vaginal bleeding.

## 2024-06-19 NOTE — PROGRESS NOTES
"Obstetrical Triage Note    Reason for Triage Observation: Vaginal Bleeding    Assessment   vaginal bleeding    Plan   ultrasound and Rhogam  will be ordered.  The patient will be evaluated and admitted if indicated.      Subjective   History of Present Pregnancy  Olga Christopher is a 34 y.o.  gravid, female. Patient's last menstrual period was 2024. with an Estimated Date of Delivery of 10/30/2024, by Last Menstrual Period, who is 21w0d gestation. The patient's blood type is O NEG.   Pt states she felt a sharp cramp at 0400 lasting for a couple minutes which resolved with position changes. When she woke for the day (approx 0600) and went to restroom, she noted bright red spotting with wiping. Denies cramping at this time. Last intercourse 1 month ago. Pt states she had a fall on Monday but did not hit abdomen.    Description of Present Problem   The patient presented to OB Triage with Vaginal Bleeding: She reports the onset of vaginal bleeding at 0600. The bleeding is light, bloody, and is not associated with uterine contractions.    Pregnancy Complications  No pregnancy complications identified.    Objective   Physical Examination:  Vital Signs:  /73   Pulse 96   Temp 36.5 °C (97.7 °F) (Temporal)   Resp 18   Ht 1.702 m (5' 7\")   Wt 73.6 kg (162 lb 4.1 oz)   BMI 25.41 kg/m²   GENERAL: Examination reveals a well developed, well nourished, gravid female in no acute distress. She is alert and cooperative.  LUNGS:  breathing unlabored  ABDOMEN: soft, gravid, nontender, nondistended, no abnormal masses, no epigastric pain  FHR is 140 BPM by doppler    VAGINA: abnormal discharge with bloody fluid  CERVIX: evaluated by sterile speculum exam; MEMBRANES are Intact, cervix visually closed with <1cm clot visible at anterior lip  EXTREMITIES: no redness or tenderness in the calves or thighs, no edema  NEUROLOGICAL: alert, oriented, normal speech, no focal findings or movement disorder " noted  PSYCHOLOGICAL: awake and alert; oriented to person, place, and time    BSUS- active fetus in footling breech position, adequate fluid. Placenta is anterior with no obvious placental disruption.

## 2024-06-20 ENCOUNTER — HOSPITAL ENCOUNTER (OUTPATIENT)
Dept: RADIOLOGY | Facility: CLINIC | Age: 34
Discharge: HOME | End: 2024-06-20
Payer: COMMERCIAL

## 2024-06-20 DIAGNOSIS — O36.80X0 PREGNANCY WITH INCONCLUSIVE FETAL VIABILITY (HHS-HCC): ICD-10-CM

## 2024-06-20 PROCEDURE — 76815 OB US LIMITED FETUS(S): CPT

## 2024-06-27 ENCOUNTER — APPOINTMENT (OUTPATIENT)
Dept: OBSTETRICS AND GYNECOLOGY | Facility: CLINIC | Age: 34
End: 2024-06-27
Payer: COMMERCIAL

## 2024-06-27 VITALS — WEIGHT: 165 LBS | DIASTOLIC BLOOD PRESSURE: 72 MMHG | BODY MASS INDEX: 25.84 KG/M2 | SYSTOLIC BLOOD PRESSURE: 118 MMHG

## 2024-06-27 DIAGNOSIS — O26.899 RH NEGATIVE STATE IN ANTEPARTUM PERIOD (HHS-HCC): Primary | ICD-10-CM

## 2024-06-27 DIAGNOSIS — O09.613 YOUNG PRIMIGRAVIDA IN THIRD TRIMESTER (HHS-HCC): ICD-10-CM

## 2024-06-27 DIAGNOSIS — Z3A.22 22 WEEKS GESTATION OF PREGNANCY (HHS-HCC): ICD-10-CM

## 2024-06-27 DIAGNOSIS — Z67.91 RH NEGATIVE STATE IN ANTEPARTUM PERIOD (HHS-HCC): Primary | ICD-10-CM

## 2024-06-27 PROBLEM — O03.9 COMPLETE SPONTANEOUS ABORTION (HHS-HCC): Status: RESOLVED | Noted: 2024-03-13 | Resolved: 2024-06-27

## 2024-06-27 PROCEDURE — 0501F PRENATAL FLOW SHEET: CPT | Performed by: OBSTETRICS & GYNECOLOGY

## 2024-06-27 NOTE — PROGRESS NOTES
"Subjective   Patient ID [id number]   Olga Christopher is a 34 y.o.   at 22w1d with a working estimated date of delivery of 10/30/2024, by Last Menstrual Period who presents for a routine prenatal visit. She denies vaginal bleeding, leakage of fluid, decreased fetal movements, or contractions.  She was evaluated in triage 2024 for vaginal bleeding. Ultrasound the following day was without abnormality.    Objective   Physical Exam  Weight: 74.8 kg (165 lb)  Expected Total Weight Gain: 11.5 kg (25 lb)-16 kg (35 lb)   Pregravid BMI: 23.49  BP: 118/72  Fetal Heart Rate: 145 Fundal Height (cm): 22 cm    Prenatal Labs  Urine dip:  No results found for: \"KETONESU\", \"GLUCOSEUR\", \"LEUKOCYTESUR\"    Lab Results   Component Value Date    HGB 13.0 2024    HCT 40.2 2024    ABO O 2024    HEPBSAG Nonreactive 2024         Problem List Items Addressed This Visit       Rh negative state in antepartum period ([facility]) - Primary    Overview     She received Rhogam 2024 when she presented with light bleeding.  Next Rhogam is due 2024.          22 weeks gestation of pregnancy ([facility])     Other Visit Diagnoses       Young primigravida in third trimester ([facility])                 Continue prenatal vitamin.  Labs reviewed.    Follow up as scheduled for a routine prenatal visit.  "

## 2024-06-28 ENCOUNTER — APPOINTMENT (OUTPATIENT)
Dept: OBSTETRICS AND GYNECOLOGY | Facility: CLINIC | Age: 34
End: 2024-06-28
Payer: COMMERCIAL

## 2024-07-25 ENCOUNTER — LAB (OUTPATIENT)
Dept: LAB | Facility: LAB | Age: 34
End: 2024-07-25
Payer: COMMERCIAL

## 2024-07-25 ENCOUNTER — APPOINTMENT (OUTPATIENT)
Dept: OBSTETRICS AND GYNECOLOGY | Facility: CLINIC | Age: 34
End: 2024-07-25
Payer: COMMERCIAL

## 2024-07-25 VITALS — DIASTOLIC BLOOD PRESSURE: 78 MMHG | WEIGHT: 170 LBS | BODY MASS INDEX: 26.63 KG/M2 | SYSTOLIC BLOOD PRESSURE: 102 MMHG

## 2024-07-25 DIAGNOSIS — Z11.3 SCREEN FOR STD (SEXUALLY TRANSMITTED DISEASE): ICD-10-CM

## 2024-07-25 DIAGNOSIS — O26.899 RH NEGATIVE STATE IN ANTEPARTUM PERIOD (HHS-HCC): ICD-10-CM

## 2024-07-25 DIAGNOSIS — Z67.91 RH NEGATIVE STATE IN ANTEPARTUM PERIOD (HHS-HCC): ICD-10-CM

## 2024-07-25 DIAGNOSIS — O26.892: ICD-10-CM

## 2024-07-25 DIAGNOSIS — O26.892 VAGINAL DISCHARGE DURING PREGNANCY IN SECOND TRIMESTER (HHS-HCC): ICD-10-CM

## 2024-07-25 DIAGNOSIS — N89.8: ICD-10-CM

## 2024-07-25 DIAGNOSIS — Z3A.26 26 WEEKS GESTATION OF PREGNANCY (HHS-HCC): Primary | ICD-10-CM

## 2024-07-25 DIAGNOSIS — N89.8 VAGINAL DISCHARGE DURING PREGNANCY IN SECOND TRIMESTER (HHS-HCC): ICD-10-CM

## 2024-07-25 DIAGNOSIS — O09.613 YOUNG PRIMIGRAVIDA IN THIRD TRIMESTER (HHS-HCC): ICD-10-CM

## 2024-07-25 DIAGNOSIS — O09.622 YOUNG MULTIGRAVIDA IN SECOND TRIMESTER (HHS-HCC): ICD-10-CM

## 2024-07-25 LAB
ERYTHROCYTE [DISTWIDTH] IN BLOOD BY AUTOMATED COUNT: 13 % (ref 11.5–14.5)
GLUCOSE 1H P 50 G GLC PO SERPL-MCNC: 118 MG/DL
HCT VFR BLD AUTO: 34.8 % (ref 36–46)
HGB BLD-MCNC: 11.6 G/DL (ref 12–16)
MCH RBC QN AUTO: 29.9 PG (ref 26–34)
MCHC RBC AUTO-ENTMCNC: 33.3 G/DL (ref 32–36)
MCV RBC AUTO: 90 FL (ref 80–100)
NRBC BLD-RTO: 0 /100 WBCS (ref 0–0)
PLATELET # BLD AUTO: 177 X10*3/UL (ref 150–450)
RBC # BLD AUTO: 3.88 X10*6/UL (ref 4–5.2)
WBC # BLD AUTO: 10.2 X10*3/UL (ref 4.4–11.3)

## 2024-07-25 PROCEDURE — 85027 COMPLETE CBC AUTOMATED: CPT

## 2024-07-25 PROCEDURE — 87205 SMEAR GRAM STAIN: CPT

## 2024-07-25 PROCEDURE — 86780 TREPONEMA PALLIDUM: CPT

## 2024-07-25 PROCEDURE — 87661 TRICHOMONAS VAGINALIS AMPLIF: CPT

## 2024-07-25 PROCEDURE — 0501F PRENATAL FLOW SHEET: CPT | Performed by: OBSTETRICS & GYNECOLOGY

## 2024-07-25 PROCEDURE — 87491 CHLMYD TRACH DNA AMP PROBE: CPT

## 2024-07-25 PROCEDURE — 36415 COLL VENOUS BLD VENIPUNCTURE: CPT

## 2024-07-25 PROCEDURE — 87591 N.GONORRHOEAE DNA AMP PROB: CPT

## 2024-07-25 PROCEDURE — 82947 ASSAY GLUCOSE BLOOD QUANT: CPT

## 2024-07-25 NOTE — PROGRESS NOTES
"Subjective   Patient ID [id number]   Olga Christopher is a 34 y.o.  at 26w1d with a working estimated date of delivery of 10/30/2024, by Last Menstrual Period who presents for a routine prenatal visit. She denies vaginal bleeding, leakage of fluid, decreased fetal movements, or contractions.  She was evaluated in triage 2024 for vaginal bleeding. OB Ultrasound on  24,  was normal   C/o brownish vaginal discharge, odor and vaginal irritation    Her pregnancy is complicated by:  Vestibular dysfunction with chronic dizziness        Objective   Physical Exam  Weight: 77.1 kg (170 lb)  Expected Total Weight Gain: 11.5 kg (25 lb)-16 kg (35 lb)   Pregravid BMI: 23.49  BP: 102/78         Prenatal Labs  Urine dip:  No results found for: \"KETONESU\", \"GLUCOSEUR\", \"LEUKOCYTESUR\"    Lab Results   Component Value Date    HGB 13.0 2024    HCT 40.2 2024    ABO O 2024    HEPBSAG Nonreactive 2024     No results found for: \"PAPPA\", \"AFP\", \"HCG\", \"ESTRIOL\", \"INHBA\"  No results found for: \"GLUF\", \"GLUT1\", \"YCZECEP1XJ\", \"UALJCTE4IA\"    Imaging  The most recent ultrasound was performed on   with a study GA of   and EFW of  .          Assessment/Plan   Problem List Items Addressed This Visit             ICD-10-CM    Young multigravida in second trimester ([facility]) O09.622    Rh negative state in antepartum period ([facility]) O26.899, Z67.91    26 weeks gestation of pregnancy ([facility]) - Primary Z3A.26    Vaginal discharge in pregnancy, second trimester ([facility]) O26.892, N89.8     Other Visit Diagnoses         Codes    Vaginal discharge during pregnancy in second trimester ([facility])     O26.892, N89.8    Relevant Orders    Chlamydia Trachomatis, Amplified    Neisseria gonorrhoeae, Amplified    Trichomonas vaginalis, Nucleic Acid Detection    Vaginitis Gram Stain For Bacterial Vaginosis + Yeast    Screen for STD (sexually transmitted disease)     Z11.3    Relevant Orders    Chlamydia Trachomatis, Amplified "    Neisseria gonorrhoeae, Amplified    Trichomonas vaginalis, Nucleic Acid Detection            Continue prenatal vitamin.  Speculum exam was negative for active bleeding  Significant vaginal discharge was noted  Gram stain and GC/CT/trichomonas testing was ordered  Follow up in 2 weeks for a routine prenatal visit.

## 2024-07-26 LAB
C TRACH RRNA SPEC QL NAA+PROBE: NEGATIVE
CLUE CELLS VAG LPF-#/AREA: NORMAL /[LPF]
N GONORRHOEA DNA SPEC QL PROBE+SIG AMP: NEGATIVE
NUGENT SCORE: 0
REFLEX ADDED, ANEMIA PANEL: NORMAL
T VAGINALIS RRNA SPEC QL NAA+PROBE: NEGATIVE
TREPONEMA PALLIDUM IGG+IGM AB [PRESENCE] IN SERUM OR PLASMA BY IMMUNOASSAY: NONREACTIVE
YEAST VAG WET PREP-#/AREA: NORMAL

## 2024-08-08 ENCOUNTER — APPOINTMENT (OUTPATIENT)
Dept: OBSTETRICS AND GYNECOLOGY | Facility: CLINIC | Age: 34
End: 2024-08-08
Payer: COMMERCIAL

## 2024-08-08 VITALS — BODY MASS INDEX: 26.78 KG/M2 | SYSTOLIC BLOOD PRESSURE: 120 MMHG | DIASTOLIC BLOOD PRESSURE: 72 MMHG | WEIGHT: 171 LBS

## 2024-08-08 DIAGNOSIS — O09.623 YOUNG MULTIGRAVIDA IN THIRD TRIMESTER (HHS-HCC): ICD-10-CM

## 2024-08-08 DIAGNOSIS — Z23 NEED FOR TDAP VACCINATION: ICD-10-CM

## 2024-08-08 DIAGNOSIS — Z3A.28 28 WEEKS GESTATION OF PREGNANCY (HHS-HCC): ICD-10-CM

## 2024-08-08 DIAGNOSIS — O26.899 RH NEGATIVE STATE IN ANTEPARTUM PERIOD (HHS-HCC): ICD-10-CM

## 2024-08-08 DIAGNOSIS — Z67.91 RH NEGATIVE STATE IN ANTEPARTUM PERIOD (HHS-HCC): ICD-10-CM

## 2024-08-08 DIAGNOSIS — O36.8131 DECREASED FETAL MOVEMENTS IN THIRD TRIMESTER, FETUS 1 OF MULTIPLE GESTATION (HHS-HCC): Primary | ICD-10-CM

## 2024-08-08 PROCEDURE — 90471 IMMUNIZATION ADMIN: CPT | Performed by: OBSTETRICS & GYNECOLOGY

## 2024-08-08 PROCEDURE — 59025 FETAL NON-STRESS TEST: CPT | Performed by: OBSTETRICS & GYNECOLOGY

## 2024-08-08 PROCEDURE — 0501F PRENATAL FLOW SHEET: CPT | Performed by: OBSTETRICS & GYNECOLOGY

## 2024-08-08 PROCEDURE — 90715 TDAP VACCINE 7 YRS/> IM: CPT | Performed by: OBSTETRICS & GYNECOLOGY

## 2024-08-08 NOTE — PROGRESS NOTES
"Subjective   Patient ID [id number]   Olga Christopher is a 34 y.o.  at 28w1d with a working estimated date of delivery of 10/30/2024, by Last Menstrual Period who presents for a routine prenatal visit. She denies vaginal bleeding, leakage of fluid, or contractions.  She was evaluated in triage 2024 for vaginal bleeding. Ultrasound the following day was without abnormality.  Planes of decreased fetal movement today  Her pregnancy is complicated by:  Vestibular dysfunction with chronic dizziness        Objective   Physical Exam:   Weight: 77.6 kg (171 lb)  Expected Total Weight Gain: 11.5 kg (25 lb)-16 kg (35 lb)   Pregravid BMI: 23.49  BP: 120/72  Fetal Heart Rate: 146 Fundal Height (cm): 30 cm             Prenatal Labs  Urine Dip:  No results found for: \"KETONESU\", \"GLUCOSEUR\", \"LEUKOCYTESUR\"  Lab Results   Component Value Date    HGB 11.6 (L) 2024    HCT 34.8 (L) 2024    ABO O 2024    HEPBSAG Nonreactive 2024     No results found for: \"PAPPA\", \"AFP\", \"HCG\", \"ESTRIOL\", \"INHBA\"  No results found for: \"GLUF\", \"GLUT1\", \"KUVPCCN4KQ\", \"XXKCHRR1BR\"    Imaging  The most recent ultrasound was performed on   with a study GA of   and EFW of  .          Assessment/Plan   Problem List Items Addressed This Visit             ICD-10-CM    Rh negative state in antepartum period ([facility]) O26.899, Z67.91    28 weeks gestation of pregnancy ([facility]) Z3A.28    Decreased fetal movements in third trimester ([facility]) - Primary O36.8130    Relevant Orders    Fetal nonstress test, once    Young multigravida in third trimester ([facility]) O09.623     Other Visit Diagnoses         Codes    Need for Tdap vaccination     Z23    Relevant Orders    Tdap vaccine, age 7 years and older  (BOOSTRIX) (Completed)          RNST today  Next Rhogam is due 2024.   Continue prenatal vitamin.  Expected mode of delivery :  Follow up in 2 week for a routine prenatal visit.  " Vaccines UTD.  Denies any vaccines in the past 14 days. FMH:  10 y/o brother: No PMH  Mother: Hx of 2 C-sections, appendectomy, hx of ovarian cyst removal  Father: No PMH  MGM:  from lung cancer  MGF: Decreased  PGM and PGF: Live in [facility] and are reportedly healthy 18 y/o female with PMH significant for intermittent right/left (R>L) upper quadrant pain for over one year.  Endoscopy noted duodenal erosion, esophagitis with H Pylori gastritis.  She was tx for H pylori and completed a course of antibiotics and prescribed Nexium for her esophagitis  See in ED, sonogram showed a 1.5 shadowing gallstone in the gallbladder neck without any gallbladder thickening or pericholecystic fluid.  Pt. is now scheduled for a laparoscopic cholecystectomy with intraoperative cholangiogram.    Pt. presents to PST well-appearing without any evidence of acute illness or infection. 18 y/o female with PMH significant for intermittent right/left (R>L) upper quadrant pain for over one year.  Endoscopy noted duodenal erosion, esophagitis with H Pylori gastritis.  She was tx for H pylori and completed a course of antibiotics and prescribed Nexium for her esophagitis  Seen in ED on 5/26/19, sonogram showed a 1.5 shadowing gallstone in the gallbladder neck without any gallbladder thickening or pericholecystic fluid.  Pt. is now scheduled for a laparoscopic cholecystectomy with intraoperative cholangiogram.    Pt. presents to PST well-appearing without any evidence of acute illness or infection. 16 y/o female with PMH significant for intermittent right/left (R>L) upper quadrant pain for over one year.  Endoscopy noted duodenal erosion, esophagitis with H Pylori gastritis.  She was tx for H pylori and completed a course of antibiotics and prescribed Nexium for her esophagitis  Seen in ED on 5/26/19, sonogram showed a 1.5 shadowing gallstone in the gallbladder neck without any gallbladder thickening or pericholecystic fluid.  Pt. is now scheduled for a laparoscopic cholecystectomy with intraoperative cholangiogram.

## 2024-08-08 NOTE — PROCEDURES
Olga Christopher, a  at 28w1d with an CAPRI of 10/30/2024, by Last Menstrual Period, was seen at [facility] for a nonstress test.    Non-Stress Test   Baseline Fetal Heart Rate for Non-Stress Test: 140 BPM  Variability in Waveform for Non-Stress Test: Moderate  Accelerations in Non-Stress Test: Yes, greater than/equal to 15 bpm, lasting at least 15 seconds  Decelerations in Non-Stress Test: None  Contractions in Non-Stress Test: Not present  Acoustic Stimulator for Non-Stress Test: No  Interpretation of Non-Stress Test   Interpretation of Non-Stress Test: Reactive  NST for Multiple Fetuses: No

## 2024-08-22 ENCOUNTER — APPOINTMENT (OUTPATIENT)
Dept: OBSTETRICS AND GYNECOLOGY | Facility: CLINIC | Age: 34
End: 2024-08-22
Payer: COMMERCIAL

## 2024-08-22 VITALS — WEIGHT: 174 LBS | SYSTOLIC BLOOD PRESSURE: 118 MMHG | DIASTOLIC BLOOD PRESSURE: 78 MMHG | BODY MASS INDEX: 27.25 KG/M2

## 2024-08-22 DIAGNOSIS — O26.899 RH NEGATIVE STATE IN ANTEPARTUM PERIOD (HHS-HCC): ICD-10-CM

## 2024-08-22 DIAGNOSIS — Z67.91 RH NEGATIVE STATE IN ANTEPARTUM PERIOD (HHS-HCC): ICD-10-CM

## 2024-08-22 DIAGNOSIS — Z3A.30 30 WEEKS GESTATION OF PREGNANCY (HHS-HCC): Primary | ICD-10-CM

## 2024-08-22 DIAGNOSIS — O09.623 YOUNG MULTIGRAVIDA IN THIRD TRIMESTER (HHS-HCC): ICD-10-CM

## 2024-08-22 PROBLEM — O26.892: Status: RESOLVED | Noted: 2024-07-25 | Resolved: 2024-08-22

## 2024-08-22 PROBLEM — N89.8: Status: RESOLVED | Noted: 2024-07-25 | Resolved: 2024-08-22

## 2024-08-22 PROBLEM — O36.8130 DECREASED FETAL MOVEMENTS IN THIRD TRIMESTER (HHS-HCC): Status: RESOLVED | Noted: 2024-03-13 | Resolved: 2024-08-22

## 2024-08-22 PROCEDURE — 96372 THER/PROPH/DIAG INJ SC/IM: CPT | Performed by: OBSTETRICS & GYNECOLOGY

## 2024-08-22 PROCEDURE — 0501F PRENATAL FLOW SHEET: CPT | Performed by: OBSTETRICS & GYNECOLOGY

## 2024-08-22 NOTE — PROGRESS NOTES
"Subjective   Patient ID [id number]   Olga Christopher is a 34 y.o.  at 30w1d with a working estimated date of delivery of 10/30/2024, by Last Menstrual Period who presents for a routine prenatal visit. She denies vaginal bleeding, leakage of fluid, or contractions.  She was evaluated in triage 2024 for vaginal bleeding. Ultrasound the following day was without abnormality.    Her pregnancy is complicated by:  Vestibular dysfunction with chronic dizziness        Objective   Physical Exam:   Weight: 78.9 kg (174 lb)  Expected Total Weight Gain: 11.5 kg (25 lb)-16 kg (35 lb)   Pregravid BMI: 23.49  BP: 118/78  Fetal Heart Rate: 152 Fundal Height (cm): 30 cm             Prenatal Labs  Urine Dip:  No results found for: \"KETONESU\", \"GLUCOSEUR\", \"LEUKOCYTESUR\"  Lab Results   Component Value Date    HGB 11.6 (L) 2024    HCT 34.8 (L) 2024    ABO O 2024    HEPBSAG Nonreactive 2024     No results found for: \"PAPPA\", \"AFP\", \"HCG\", \"ESTRIOL\", \"INHBA\"  No results found for: \"GLUF\", \"GLUT1\", \"WUCVQXA9VH\", \"RQEGMVK3AW\"    Imaging  The most recent ultrasound was performed on   with a study GA of   and EFW of  .          Assessment/Plan   Problem List Items Addressed This Visit             ICD-10-CM    Rh negative state in antepartum period ([facility]) O26.899, Z67.91    Relevant Medications    Rho(D) immune globulin (RHOGAM) injection 300 mcg (Completed)    Young multigravida in third trimester ([facility]) O09.623    30 weeks gestation of pregnancy ([facility]) - Primary Z3A.30         Next Rhogam is due 2024.   Continue prenatal vitamin.  Expected mode of delivery :  Follow up in 2 week for a routine prenatal visit.  "

## 2024-08-30 NOTE — PROGRESS NOTES
"Subjective   Patient ID [id number]   Olga Christopher is a 34 y.o.  at 32w1d with a working estimated date of delivery of 10/30/2024, by Last Menstrual Period who presents for a routine prenatal visit. She denies vaginal bleeding, leakage of fluid, or contractions.    Her pregnancy is complicated by:  Vestibular dysfunction with chronic dizziness        Objective   Physical Exam:   Weight: 78.9 kg (174 lb)  Expected Total Weight Gain: 11.5 kg (25 lb)-16 kg (35 lb)   Pregravid BMI: 23.49  BP: 110/78  Fetal Heart Rate: 133 Fundal Height (cm): 33 cm             Prenatal Labs  Urine Dip:  No results found for: \"KETONESU\", \"GLUCOSEUR\", \"LEUKOCYTESUR\"  Lab Results   Component Value Date    HGB 11.6 (L) 2024    HCT 34.8 (L) 2024    ABO O 2024    HEPBSAG Nonreactive 2024     No results found for: \"PAPPA\", \"AFP\", \"HCG\", \"ESTRIOL\", \"INHBA\"  No results found for: \"GLUF\", \"GLUT1\", \"UWBIHES1KO\", \"GLARJYC1OT\"    Imaging  The most recent ultrasound was performed on The most recent ultrasound study is not finalized with a study GA of The most recent ultrasound study is not finalized and EFW of The most recent ultrasound study is not finalized.  The most recent ultrasound study is not finalized  The most recent ultrasound study is not finalized    Assessment/Plan   Problem List Items Addressed This Visit             ICD-10-CM    Rh negative state in antepartum period ([facility]) O26.899, Z67.91    Young multigravida in third trimester ([facility]) - Primary O09.623    32 weeks gestation of pregnancy ([facility]) Z3A.32           Next Rhogam is due 2024.   Continue prenatal vitamin.  Expected mode of delivery :  RSV vaccine info. provided  Follow up in 2 week for a routine prenatal visit.  "

## 2024-09-05 ENCOUNTER — APPOINTMENT (OUTPATIENT)
Dept: OBSTETRICS AND GYNECOLOGY | Facility: CLINIC | Age: 34
End: 2024-09-05
Payer: COMMERCIAL

## 2024-09-05 VITALS — BODY MASS INDEX: 27.25 KG/M2 | WEIGHT: 174 LBS | SYSTOLIC BLOOD PRESSURE: 110 MMHG | DIASTOLIC BLOOD PRESSURE: 78 MMHG

## 2024-09-05 DIAGNOSIS — O26.899 RH NEGATIVE STATE IN ANTEPARTUM PERIOD (HHS-HCC): ICD-10-CM

## 2024-09-05 DIAGNOSIS — Z3A.32 32 WEEKS GESTATION OF PREGNANCY (HHS-HCC): ICD-10-CM

## 2024-09-05 DIAGNOSIS — O09.623 YOUNG MULTIGRAVIDA IN THIRD TRIMESTER (HHS-HCC): Primary | ICD-10-CM

## 2024-09-05 DIAGNOSIS — Z67.91 RH NEGATIVE STATE IN ANTEPARTUM PERIOD (HHS-HCC): ICD-10-CM

## 2024-09-05 PROCEDURE — 0501F PRENATAL FLOW SHEET: CPT | Performed by: OBSTETRICS & GYNECOLOGY

## 2024-09-17 PROBLEM — Z3A.34 34 WEEKS GESTATION OF PREGNANCY (HHS-HCC): Status: ACTIVE | Noted: 2024-03-29

## 2024-09-19 ENCOUNTER — APPOINTMENT (OUTPATIENT)
Dept: OBSTETRICS AND GYNECOLOGY | Facility: CLINIC | Age: 34
End: 2024-09-19
Payer: COMMERCIAL

## 2024-09-23 ENCOUNTER — APPOINTMENT (OUTPATIENT)
Dept: OBSTETRICS AND GYNECOLOGY | Facility: CLINIC | Age: 34
End: 2024-09-23
Payer: COMMERCIAL

## 2024-09-23 VITALS — WEIGHT: 180.8 LBS | SYSTOLIC BLOOD PRESSURE: 110 MMHG | BODY MASS INDEX: 28.32 KG/M2 | DIASTOLIC BLOOD PRESSURE: 80 MMHG

## 2024-09-23 DIAGNOSIS — Z3A.34 34 WEEKS GESTATION OF PREGNANCY (HHS-HCC): Primary | ICD-10-CM

## 2024-09-23 LAB
POC GLUCOSE, URINE: NEGATIVE MG/DL
POC KETONES, URINE: ABNORMAL MG/DL
POC PROTEIN, URINE: NEGATIVE MG/DL

## 2024-09-23 PROCEDURE — 0501F PRENATAL FLOW SHEET: CPT | Performed by: OBSTETRICS & GYNECOLOGY

## 2024-09-23 NOTE — PROGRESS NOTES
"Subjective   Patient ID [id number]   Olga Christopher is a 34 y.o.  at 34w5d with a working estimated date of delivery of 10/30/2024, by Last Menstrual Period who presents for a routine prenatal visit. She denies vaginal bleeding, leakage of fluid, decreased fetal movements, or contractions.  Doing well.  Felt a lot of pressure last week but has gotten a little better since.  Says was 4 cm at 36 weeks last pregnancy.       Her pregnancy is complicated by:  None    Objective   Physical Exam:   Weight: 82 kg (180 lb 12.8 oz)  Expected Total Weight Gain: 11.5 kg (25 lb)-16 kg (35 lb)   Pregravid BMI: 23.49  BP: 110/80  Fetal Heart Rate: 137               Prenatal Labs  Urine Dip:  Lab Results   Component Value Date    KETONESU 15 (1+) (A) 2024     Lab Results   Component Value Date    HGB 11.6 (L) 2024    HCT 34.8 (L) 2024    ABO O 2024    HEPBSAG Nonreactive 2024     No results found for: \"PAPPA\", \"AFP\", \"HCG\", \"ESTRIOL\", \"INHBA\"  No results found for: \"GLUF\", \"GLUT1\", \"ACNIMID5VB\", \"OQMEGJD5IN\"    Imaging  The most recent ultrasound was performed on The most recent ultrasound study is not finalized with a study GA of The most recent ultrasound study is not finalized and EFW of The most recent ultrasound study is not finalized.  The most recent ultrasound study is not finalized  The most recent ultrasound study is not finalized    Assessment/Plan   Problem List Items Addressed This Visit             ICD-10-CM    34 weeks gestation of pregnancy ([facility]) - Primary Z3A.34    Relevant Orders    POCT UA Automated manually resulted (Completed)     Continue prenatal vitamin.  Labs reviewed.  GBS taken.  Expected mode of delivery Vaginal  Follow up in 1 week for a routine prenatal visit.  "

## 2024-09-30 PROBLEM — Z3A.36 36 WEEKS GESTATION OF PREGNANCY (HHS-HCC): Status: ACTIVE | Noted: 2024-03-29

## 2024-09-30 NOTE — PROGRESS NOTES
"Subjective   Patient ID [id number]   Olga Christopher is a 34 y.o.  at 36w1d with a working estimated date of delivery of 10/30/2024, by Last Menstrual Period who presents for a routine prenatal visit. She denies vaginal bleeding, leakage of fluid, or contractions.    Her pregnancy is complicated by:  Vestibular dysfunction with chronic dizziness        Objective   Physical Exam:   Weight: 82.1 kg (181 lb)  Expected Total Weight Gain: 11.5 kg (25 lb)-16 kg (35 lb)   Pregravid BMI: 23.49  BP: 120/84  Fetal Heart Rate: 146 Fundal Height (cm): 35 cm    Dilation: Closed Effacement (%): 60 Fetal Station: -2    Prenatal Labs  Urine Dip:  Lab Results   Component Value Date    KETONESU 15 (1+) (A) 2024     Lab Results   Component Value Date    HGB 11.6 (L) 2024    HCT 34.8 (L) 2024    ABO O 2024    HEPBSAG Nonreactive 2024     No results found for: \"PAPPA\", \"AFP\", \"HCG\", \"ESTRIOL\", \"INHBA\"  No results found for: \"GLUF\", \"GLUT1\", \"EIXZRKM6FW\", \"NIGOQWC8OX\"    Imaging  The most recent ultrasound was performed on The most recent ultrasound study is not finalized with a study GA of The most recent ultrasound study is not finalized and EFW of The most recent ultrasound study is not finalized.  The most recent ultrasound study is not finalized  The most recent ultrasound study is not finalized    Assessment/Plan   Problem List Items Addressed This Visit             ICD-10-CM    Rh negative state in antepartum period ([facility]) O26.899, Z67.91    Young multigravida in third trimester ([facility]) O09.623    36 weeks gestation of pregnancy ([facility]) - Primary Z3A.36    Relevant Orders    Group B Streptococcus (GBS) Prenatal Screen, Culture    Idiopathic scoliosis of thoracolumbar spine M41.25     Had scoliosis surgery at 12 years of age in thoracic region  Denies any neurological or muscular symptoms  Had uncomplicated epidural with previous pregnancy          Other Visit Diagnoses         Codes    Need " for RSV immunization     Z29.11    Relevant Orders    Respiratory Syncytial Virus (RSV), Eligible Pregnant Pts, 0.5 mL (ABRYSVO) (Completed)            Continue prenatal vitamin.  Expected mode of delivery :  GBS ordered  RSV done  Growth ultrasound   Follow up in 1 week for a routine prenatal visit.

## 2024-10-03 ENCOUNTER — APPOINTMENT (OUTPATIENT)
Dept: OBSTETRICS AND GYNECOLOGY | Facility: CLINIC | Age: 34
End: 2024-10-03
Payer: COMMERCIAL

## 2024-10-03 VITALS — SYSTOLIC BLOOD PRESSURE: 120 MMHG | WEIGHT: 181 LBS | DIASTOLIC BLOOD PRESSURE: 84 MMHG | BODY MASS INDEX: 28.35 KG/M2

## 2024-10-03 DIAGNOSIS — Z3A.36 36 WEEKS GESTATION OF PREGNANCY (HHS-HCC): Primary | ICD-10-CM

## 2024-10-03 DIAGNOSIS — Z67.91 RH NEGATIVE STATE IN ANTEPARTUM PERIOD (HHS-HCC): ICD-10-CM

## 2024-10-03 DIAGNOSIS — O26.899 RH NEGATIVE STATE IN ANTEPARTUM PERIOD (HHS-HCC): ICD-10-CM

## 2024-10-03 DIAGNOSIS — M41.25 OTHER IDIOPATHIC SCOLIOSIS, THORACOLUMBAR REGION: ICD-10-CM

## 2024-10-03 DIAGNOSIS — Z29.11 NEED FOR RSV IMMUNIZATION: ICD-10-CM

## 2024-10-03 DIAGNOSIS — O09.623 YOUNG MULTIGRAVIDA IN THIRD TRIMESTER (HHS-HCC): ICD-10-CM

## 2024-10-03 PROCEDURE — 87081 CULTURE SCREEN ONLY: CPT

## 2024-10-03 NOTE — ASSESSMENT & PLAN NOTE
Had scoliosis surgery at 12 years of age in thoracic region  Denies any neurological or muscular symptoms  Had uncomplicated epidural with previous pregnancy   Anemia of chronic disease Anemia of chronic disease Anemia of chronic disease

## 2024-10-04 ENCOUNTER — HOSPITAL ENCOUNTER (OUTPATIENT)
Dept: RADIOLOGY | Facility: CLINIC | Age: 34
Discharge: HOME | End: 2024-10-04
Payer: COMMERCIAL

## 2024-10-04 DIAGNOSIS — O36.80X0 PREGNANCY WITH INCONCLUSIVE FETAL VIABILITY: ICD-10-CM

## 2024-10-04 DIAGNOSIS — Z03.74 ENCOUNTER FOR SUSPECTED PROBLEM WITH FETAL GROWTH RULED OUT: ICD-10-CM

## 2024-10-04 PROCEDURE — 76816 OB US FOLLOW-UP PER FETUS: CPT

## 2024-10-06 LAB — GP B STREP GENITAL QL CULT: NORMAL

## 2024-10-07 ENCOUNTER — TELEPHONE (OUTPATIENT)
Dept: OBSTETRICS AND GYNECOLOGY | Facility: CLINIC | Age: 34
End: 2024-10-07
Payer: COMMERCIAL

## 2024-10-07 DIAGNOSIS — O09.623 YOUNG MULTIGRAVIDA IN THIRD TRIMESTER (HHS-HCC): ICD-10-CM

## 2024-10-07 NOTE — TELEPHONE ENCOUNTER
Patient stated that she was told to pick an induction date, however the last ultrasound she was told they moved her Due Date up to 10-19.  She wanted 10/25 if that would be ok.  Please advise.

## 2024-10-10 ENCOUNTER — APPOINTMENT (OUTPATIENT)
Dept: OBSTETRICS AND GYNECOLOGY | Facility: CLINIC | Age: 34
End: 2024-10-10
Payer: COMMERCIAL

## 2024-10-11 ENCOUNTER — APPOINTMENT (OUTPATIENT)
Dept: OBSTETRICS AND GYNECOLOGY | Facility: CLINIC | Age: 34
End: 2024-10-11
Payer: COMMERCIAL

## 2024-10-11 PROBLEM — Z3A.37 37 WEEKS GESTATION OF PREGNANCY (HHS-HCC): Status: ACTIVE | Noted: 2024-03-29

## 2024-10-11 NOTE — PROGRESS NOTES
"Subjective   Patient ID [id number]   Olga Christopher is a 34 y.o.  at 37w5d with a working estimated date of delivery of 10/30/2024, by Last Menstrual Period who presents for a routine prenatal visit. She denies vaginal bleeding, leakage of fluid, or contractions.    Her pregnancy is complicated by:  Vestibular dysfunction with chronic dizziness        Objective   Physical Exam:   Weight: 82.1 kg (181 lb)  Expected Total Weight Gain: 11.5 kg (25 lb)-16 kg (35 lb)   Pregravid BMI: 23.49  BP: 122/80  Fetal Heart Rate: 140 Fundal Height (cm): 34 cm Presentation: Vertex  Dilation: 4 Effacement (%): 70 Fetal Station: -2    Prenatal Labs  Urine Dip:  Lab Results   Component Value Date    KETONESU 15 (1+) (A) 2024     Lab Results   Component Value Date    HGB 11.6 (L) 2024    HCT 34.8 (L) 2024    ABO O 2024    HEPBSAG Nonreactive 2024     No results found for: \"PAPPA\", \"AFP\", \"HCG\", \"ESTRIOL\", \"INHBA\"  No results found for: \"GLUF\", \"GLUT1\", \"AUARJBI0BF\", \"EFSVDDT0GU\"    Imaging  The most recent ultrasound was performed on   with a study GA of   and EFW of  .          Assessment/Plan   Problem List Items Addressed This Visit             ICD-10-CM    Rh negative state in antepartum period ([facility]) O26.899, Z67.91    Young multigravida in third trimester ([facility]) O09.623    Idiopathic scoliosis of thoracolumbar spine M41.25    37 weeks gestation of pregnancy ([facility]) - Primary Z3A.37         Continue prenatal vitamin.  Expected mode of delivery :  GBS negative  AGA on Growth ultrasound   Scheduled for IOL on 10/25/24  Follow up in 1 week for a routine prenatal visit.  "

## 2024-10-14 ENCOUNTER — TELEPHONE (OUTPATIENT)
Dept: OBSTETRICS AND GYNECOLOGY | Facility: CLINIC | Age: 34
End: 2024-10-14

## 2024-10-14 ENCOUNTER — APPOINTMENT (OUTPATIENT)
Dept: OBSTETRICS AND GYNECOLOGY | Facility: CLINIC | Age: 34
End: 2024-10-14
Payer: COMMERCIAL

## 2024-10-14 VITALS — BODY MASS INDEX: 28.35 KG/M2 | DIASTOLIC BLOOD PRESSURE: 80 MMHG | WEIGHT: 181 LBS | SYSTOLIC BLOOD PRESSURE: 122 MMHG

## 2024-10-14 DIAGNOSIS — Z67.91 RH NEGATIVE STATE IN ANTEPARTUM PERIOD (HHS-HCC): ICD-10-CM

## 2024-10-14 DIAGNOSIS — O26.899 RH NEGATIVE STATE IN ANTEPARTUM PERIOD (HHS-HCC): ICD-10-CM

## 2024-10-14 DIAGNOSIS — Z3A.37 37 WEEKS GESTATION OF PREGNANCY (HHS-HCC): Primary | ICD-10-CM

## 2024-10-14 DIAGNOSIS — M41.25 OTHER IDIOPATHIC SCOLIOSIS, THORACOLUMBAR REGION: ICD-10-CM

## 2024-10-14 DIAGNOSIS — O09.623 YOUNG MULTIGRAVIDA IN THIRD TRIMESTER (HHS-HCC): ICD-10-CM

## 2024-10-14 PROCEDURE — 0501F PRENATAL FLOW SHEET: CPT | Performed by: OBSTETRICS & GYNECOLOGY

## 2024-10-14 NOTE — TELEPHONE ENCOUNTER
Patient called in and noticed some blood show after appointment and SVE today. Advised patient that it is normal to notice a little bloody show and that she can monitor at this time. Patient denies any leaking of fluid. Patient will monitor and advised if bright red vaginal bleeding like a period occurs or gush of fluid happens then she should go to [facility] L&D for further evaluation.

## 2024-10-15 PROBLEM — Z3A.38 38 WEEKS GESTATION OF PREGNANCY (HHS-HCC): Status: ACTIVE | Noted: 2024-03-29

## 2024-10-17 ENCOUNTER — APPOINTMENT (OUTPATIENT)
Dept: OBSTETRICS AND GYNECOLOGY | Facility: CLINIC | Age: 34
End: 2024-10-17
Payer: COMMERCIAL

## 2024-10-17 VITALS — SYSTOLIC BLOOD PRESSURE: 138 MMHG | BODY MASS INDEX: 28.66 KG/M2 | DIASTOLIC BLOOD PRESSURE: 90 MMHG | WEIGHT: 183 LBS

## 2024-10-17 DIAGNOSIS — Z3A.38 38 WEEKS GESTATION OF PREGNANCY (HHS-HCC): Primary | ICD-10-CM

## 2024-10-17 DIAGNOSIS — O09.623 YOUNG MULTIGRAVIDA IN THIRD TRIMESTER (HHS-HCC): ICD-10-CM

## 2024-10-17 DIAGNOSIS — O26.899 RH NEGATIVE STATE IN ANTEPARTUM PERIOD (HHS-HCC): ICD-10-CM

## 2024-10-17 DIAGNOSIS — Z67.91 RH NEGATIVE STATE IN ANTEPARTUM PERIOD (HHS-HCC): ICD-10-CM

## 2024-10-17 PROCEDURE — 0501F PRENATAL FLOW SHEET: CPT | Performed by: OBSTETRICS & GYNECOLOGY

## 2024-10-17 NOTE — PROGRESS NOTES
"Subjective   Patient ID [id number]   Olga Christopher is a 34 y.o.  at 38w1d with a working estimated date of delivery of 10/30/2024, by Last Menstrual Period who presents for a routine prenatal visit. She denies vaginal bleeding, leakage of fluid, or contractions.    Her pregnancy is complicated by:  Vestibular dysfunction with chronic dizziness        Objective   Physical Exam:   Weight: 83 kg (183 lb)  Expected Total Weight Gain: 11.5 kg (25 lb)-16 kg (35 lb)   Pregravid BMI: 23.49  BP: 130/90  Fetal Heart Rate: 150 Fundal Height (cm): 34 cm Presentation: Vertex  Dilation: 5 Effacement (%): 70 Fetal Station: -2    Prenatal Labs  Urine Dip:  Lab Results   Component Value Date    KETONESU 15 (1+) (A) 2024     Lab Results   Component Value Date    HGB 11.6 (L) 2024    HCT 34.8 (L) 2024    ABO O 2024    HEPBSAG Nonreactive 2024     No results found for: \"PAPPA\", \"AFP\", \"HCG\", \"ESTRIOL\", \"INHBA\"  No results found for: \"GLUF\", \"GLUT1\", \"UWQZDBV4MP\", \"QJRYLVG6EC\"    Imaging  The most recent ultrasound was performed on   with a study GA of   and EFW of  .          Assessment/Plan   Problem List Items Addressed This Visit             ICD-10-CM    Rh negative state in antepartum period ([facility]) O26.899, Z67.91    Young multigravida in third trimester ([facility]) O09.623    38 weeks gestation of pregnancy ([facility]) - Primary Z3A.38           Continue prenatal vitamin.  Expected mode of delivery :  GBS negative  AGA on Growth ultrasound   Borderline BP, will start home monitoring  BP log provided, has BP cuff at home.   Reviewed s/s of sPEC and when to call   Scheduled for IOL on 10/25/24  F/u postpartum  "

## 2024-10-19 ENCOUNTER — HOSPITAL ENCOUNTER (INPATIENT)
Facility: HOSPITAL | Age: 34
End: 2024-10-19
Attending: STUDENT IN AN ORGANIZED HEALTH CARE EDUCATION/TRAINING PROGRAM | Admitting: OBSTETRICS & GYNECOLOGY
Payer: COMMERCIAL

## 2024-10-19 ENCOUNTER — ANESTHESIA (OUTPATIENT)
Dept: OBSTETRICS AND GYNECOLOGY | Facility: HOSPITAL | Age: 34
End: 2024-10-19
Payer: COMMERCIAL

## 2024-10-19 ENCOUNTER — ANESTHESIA EVENT (OUTPATIENT)
Dept: OBSTETRICS AND GYNECOLOGY | Facility: HOSPITAL | Age: 34
End: 2024-10-19
Payer: COMMERCIAL

## 2024-10-19 DIAGNOSIS — Z3A.38 38 WEEKS GESTATION OF PREGNANCY (HHS-HCC): Primary | ICD-10-CM

## 2024-10-19 DIAGNOSIS — O36.8390 ANTEPARTUM NON-REASSURING FETAL HEART RATE OR RHYTHM AFFECTING CARE OF MOTHER: ICD-10-CM

## 2024-10-19 DIAGNOSIS — Z98.891 STATUS POST EMERGENCY CESAREAN SECTION: ICD-10-CM

## 2024-10-19 LAB
ABO GROUP (TYPE) IN BLOOD: NORMAL
ALBUMIN SERPL BCP-MCNC: 3.4 G/DL (ref 3.4–5)
ALP SERPL-CCNC: 140 U/L (ref 33–110)
ALT SERPL W P-5'-P-CCNC: 7 U/L (ref 7–45)
ANION GAP SERPL CALC-SCNC: 14 MMOL/L (ref 10–20)
ANTIBODY SCREEN: NORMAL
AST SERPL W P-5'-P-CCNC: 11 U/L (ref 9–39)
BASE EXCESS BLDCOA CALC-SCNC: -5.2 MMOL/L (ref -10.8–-0.5)
BASE EXCESS BLDCOV CALC-SCNC: -4.6 MMOL/L (ref -8.1–-0.5)
BILIRUB SERPL-MCNC: 0.3 MG/DL (ref 0–1.2)
BODY TEMPERATURE: 37 DEGREES CELSIUS
BODY TEMPERATURE: 37 DEGREES CELSIUS
BUN SERPL-MCNC: 8 MG/DL (ref 6–23)
CALCIUM SERPL-MCNC: 8.6 MG/DL (ref 8.6–10.3)
CHLORIDE SERPL-SCNC: 106 MMOL/L (ref 98–107)
CO2 SERPL-SCNC: 21 MMOL/L (ref 21–32)
CREAT SERPL-MCNC: 0.41 MG/DL (ref 0.5–1.05)
EGFRCR SERPLBLD CKD-EPI 2021: >90 ML/MIN/1.73M*2
ERYTHROCYTE [DISTWIDTH] IN BLOOD BY AUTOMATED COUNT: 13.7 % (ref 11.5–14.5)
GLUCOSE SERPL-MCNC: 87 MG/DL (ref 74–99)
HCO3 BLDCOA-SCNC: 24.3 MMOL/L (ref 15–29)
HCO3 BLDCOV-SCNC: 23.2 MMOL/L (ref 16–26)
HCT VFR BLD AUTO: 33.3 % (ref 36–46)
HGB BLD-MCNC: 10.9 G/DL (ref 12–16)
INHALED O2 CONCENTRATION: 21 %
INHALED O2 CONCENTRATION: 21 %
MCH RBC QN AUTO: 27.7 PG (ref 26–34)
MCHC RBC AUTO-ENTMCNC: 32.7 G/DL (ref 32–36)
MCV RBC AUTO: 85 FL (ref 80–100)
NRBC BLD-RTO: 0 /100 WBCS (ref 0–0)
OXYHGB MFR BLDCOA: 15.2 % (ref 94–98)
OXYHGB MFR BLDCOV: 37 % (ref 94–98)
PCO2 BLDCOA: 65 MM HG (ref 31–75)
PCO2 BLDCOV: 53 MM HG (ref 22–53)
PH BLDCOA: 7.18 PH (ref 7.08–7.39)
PH BLDCOV: 7.25 PH (ref 7.19–7.47)
PLATELET # BLD AUTO: 163 X10*3/UL (ref 150–450)
PO2 BLDCOA: 12 MM HG (ref 5–31)
PO2 BLDCOV: 23 MM HG (ref 13–37)
POTASSIUM SERPL-SCNC: 3.6 MMOL/L (ref 3.5–5.3)
PROT SERPL-MCNC: 6.2 G/DL (ref 6.4–8.2)
RBC # BLD AUTO: 3.94 X10*6/UL (ref 4–5.2)
RH FACTOR (ANTIGEN D): NORMAL
SAO2 % BLDCOA: 15 % (ref 3–69)
SAO2 % BLDCOV: 38 % (ref 16–84)
SODIUM SERPL-SCNC: 137 MMOL/L (ref 136–145)
TEST COMMENT: ABNORMAL
TEST COMMENT: ABNORMAL
URATE SERPL-MCNC: 4.6 MG/DL (ref 2.3–6.7)
WBC # BLD AUTO: 12.4 X10*3/UL (ref 4.4–11.3)

## 2024-10-19 PROCEDURE — 3700000014 HC AN EPIDURAL BLOCK CHARGE: Performed by: OBSTETRICS & GYNECOLOGY

## 2024-10-19 PROCEDURE — A6213 FOAM DRG >16<=48 SQ IN W/BDR: HCPCS | Performed by: OBSTETRICS & GYNECOLOGY

## 2024-10-19 PROCEDURE — 85027 COMPLETE CBC AUTOMATED: CPT | Performed by: ADVANCED PRACTICE MIDWIFE

## 2024-10-19 PROCEDURE — 2500000004 HC RX 250 GENERAL PHARMACY W/ HCPCS (ALT 636 FOR OP/ED): Performed by: NURSE ANESTHETIST, CERTIFIED REGISTERED

## 2024-10-19 PROCEDURE — 2500000004 HC RX 250 GENERAL PHARMACY W/ HCPCS (ALT 636 FOR OP/ED): Performed by: ADVANCED PRACTICE MIDWIFE

## 2024-10-19 PROCEDURE — 1100000001 HC PRIVATE ROOM DAILY

## 2024-10-19 PROCEDURE — 88307 TISSUE EXAM BY PATHOLOGIST: CPT | Performed by: PATHOLOGY

## 2024-10-19 PROCEDURE — 85025 COMPLETE CBC W/AUTO DIFF WBC: CPT | Performed by: OBSTETRICS & GYNECOLOGY

## 2024-10-19 PROCEDURE — 59514 CESAREAN DELIVERY ONLY: CPT | Performed by: OBSTETRICS & GYNECOLOGY

## 2024-10-19 PROCEDURE — 7100000016 HC LABOR RECOVERY PER HOUR: Performed by: OBSTETRICS & GYNECOLOGY

## 2024-10-19 PROCEDURE — 86780 TREPONEMA PALLIDUM: CPT | Mod: AHULAB | Performed by: ADVANCED PRACTICE MIDWIFE

## 2024-10-19 PROCEDURE — 80053 COMPREHEN METABOLIC PANEL: CPT | Performed by: ADVANCED PRACTICE MIDWIFE

## 2024-10-19 PROCEDURE — 86901 BLOOD TYPING SEROLOGIC RH(D): CPT | Performed by: ADVANCED PRACTICE MIDWIFE

## 2024-10-19 PROCEDURE — 82805 BLOOD GASES W/O2 SATURATION: CPT | Performed by: ADVANCED PRACTICE MIDWIFE

## 2024-10-19 PROCEDURE — 36415 COLL VENOUS BLD VENIPUNCTURE: CPT | Performed by: ADVANCED PRACTICE MIDWIFE

## 2024-10-19 PROCEDURE — 83605 ASSAY OF LACTIC ACID: CPT | Performed by: OBSTETRICS & GYNECOLOGY

## 2024-10-19 PROCEDURE — 59510 CESAREAN DELIVERY: CPT | Performed by: OBSTETRICS & GYNECOLOGY

## 2024-10-19 PROCEDURE — 3700000014 EPIDURAL BLOCK: Performed by: NURSE ANESTHETIST, CERTIFIED REGISTERED

## 2024-10-19 PROCEDURE — 88307 TISSUE EXAM BY PATHOLOGIST: CPT | Mod: TC,AHULAB | Performed by: OBSTETRICS & GYNECOLOGY

## 2024-10-19 PROCEDURE — 80053 COMPREHEN METABOLIC PANEL: CPT | Performed by: OBSTETRICS & GYNECOLOGY

## 2024-10-19 PROCEDURE — 2500000005 HC RX 250 GENERAL PHARMACY W/O HCPCS: Performed by: NURSE ANESTHETIST, CERTIFIED REGISTERED

## 2024-10-19 PROCEDURE — 2720000007 HC OR 272 NO HCPCS: Performed by: OBSTETRICS & GYNECOLOGY

## 2024-10-19 PROCEDURE — 82810 BLOOD GASES O2 SAT ONLY: CPT | Performed by: ADVANCED PRACTICE MIDWIFE

## 2024-10-19 PROCEDURE — 84550 ASSAY OF BLOOD/URIC ACID: CPT | Performed by: ADVANCED PRACTICE MIDWIFE

## 2024-10-19 RX ORDER — FENTANYL/ROPIVACAINE/NS/PF 2MCG/ML-.2
0-25 PLASTIC BAG, INJECTION (ML) INJECTION CONTINUOUS
Status: DISCONTINUED | OUTPATIENT
Start: 2024-10-19 | End: 2024-10-20

## 2024-10-19 RX ORDER — OXYTOCIN 10 [USP'U]/ML
10 INJECTION, SOLUTION INTRAMUSCULAR; INTRAVENOUS ONCE AS NEEDED
Status: DISCONTINUED | OUTPATIENT
Start: 2024-10-19 | End: 2024-10-20

## 2024-10-19 RX ORDER — METOCLOPRAMIDE HYDROCHLORIDE 5 MG/ML
10 INJECTION INTRAMUSCULAR; INTRAVENOUS EVERY 6 HOURS PRN
Status: DISCONTINUED | OUTPATIENT
Start: 2024-10-19 | End: 2024-10-20

## 2024-10-19 RX ORDER — CEFAZOLIN 1 G/1
INJECTION, POWDER, FOR SOLUTION INTRAVENOUS AS NEEDED
Status: DISCONTINUED | OUTPATIENT
Start: 2024-10-19 | End: 2024-10-19

## 2024-10-19 RX ORDER — OXYTOCIN 10 [USP'U]/ML
10 INJECTION, SOLUTION INTRAMUSCULAR; INTRAVENOUS ONCE AS NEEDED
Status: COMPLETED | OUTPATIENT
Start: 2024-10-19 | End: 2024-10-19

## 2024-10-19 RX ORDER — HYDRALAZINE HYDROCHLORIDE 20 MG/ML
5 INJECTION INTRAMUSCULAR; INTRAVENOUS ONCE AS NEEDED
Status: DISCONTINUED | OUTPATIENT
Start: 2024-10-19 | End: 2024-10-20

## 2024-10-19 RX ORDER — MISOPROSTOL 200 UG/1
800 TABLET ORAL ONCE AS NEEDED
Status: DISCONTINUED | OUTPATIENT
Start: 2024-10-19 | End: 2024-10-20

## 2024-10-19 RX ORDER — FENTANYL/BUPIVACAINE/NS/PF 2MCG/ML-.1
PLASTIC BAG, INJECTION (ML) INJECTION AS NEEDED
Status: DISCONTINUED | OUTPATIENT
Start: 2024-10-19 | End: 2024-10-19

## 2024-10-19 RX ORDER — METHYLERGONOVINE MALEATE 0.2 MG/ML
0.2 INJECTION INTRAVENOUS ONCE AS NEEDED
Status: DISCONTINUED | OUTPATIENT
Start: 2024-10-19 | End: 2024-10-20

## 2024-10-19 RX ORDER — ONDANSETRON HYDROCHLORIDE 2 MG/ML
4 INJECTION, SOLUTION INTRAVENOUS EVERY 6 HOURS PRN
Status: DISCONTINUED | OUTPATIENT
Start: 2024-10-19 | End: 2024-10-20

## 2024-10-19 RX ORDER — KETOROLAC TROMETHAMINE 30 MG/ML
INJECTION, SOLUTION INTRAMUSCULAR; INTRAVENOUS AS NEEDED
Status: DISCONTINUED | OUTPATIENT
Start: 2024-10-19 | End: 2024-10-19

## 2024-10-19 RX ORDER — PHENYLEPHRINE HCL IN 0.9% NACL 1 MG/10 ML
SYRINGE (ML) INTRAVENOUS AS NEEDED
Status: DISCONTINUED | OUTPATIENT
Start: 2024-10-19 | End: 2024-10-19

## 2024-10-19 RX ORDER — CHLOROPROCAINE HYDROCHLORIDE 30 MG/ML
INJECTION, SOLUTION EPIDURAL; INFILTRATION; INTRACAUDAL; PERINEURAL AS NEEDED
Status: DISCONTINUED | OUTPATIENT
Start: 2024-10-19 | End: 2024-10-19

## 2024-10-19 RX ORDER — LOPERAMIDE HYDROCHLORIDE 2 MG/1
4 CAPSULE ORAL EVERY 2 HOUR PRN
Status: DISCONTINUED | OUTPATIENT
Start: 2024-10-19 | End: 2024-10-20

## 2024-10-19 RX ORDER — METOCLOPRAMIDE 10 MG/1
10 TABLET ORAL EVERY 6 HOURS PRN
Status: DISCONTINUED | OUTPATIENT
Start: 2024-10-19 | End: 2024-10-20

## 2024-10-19 RX ORDER — ONDANSETRON 4 MG/1
4 TABLET, FILM COATED ORAL EVERY 6 HOURS PRN
Status: DISCONTINUED | OUTPATIENT
Start: 2024-10-19 | End: 2024-10-20

## 2024-10-19 RX ORDER — NIFEDIPINE 10 MG/1
10 CAPSULE ORAL ONCE AS NEEDED
Status: DISCONTINUED | OUTPATIENT
Start: 2024-10-19 | End: 2024-10-20

## 2024-10-19 RX ORDER — OXYTOCIN/0.9 % SODIUM CHLORIDE 30/500 ML
60 PLASTIC BAG, INJECTION (ML) INTRAVENOUS ONCE AS NEEDED
Status: DISCONTINUED | OUTPATIENT
Start: 2024-10-19 | End: 2024-10-20

## 2024-10-19 RX ORDER — TRANEXAMIC ACID 100 MG/ML
1000 INJECTION, SOLUTION INTRAVENOUS ONCE AS NEEDED
Status: DISCONTINUED | OUTPATIENT
Start: 2024-10-19 | End: 2024-10-20

## 2024-10-19 RX ORDER — LIDOCAINE HYDROCHLORIDE 10 MG/ML
30 INJECTION, SOLUTION INFILTRATION; PERINEURAL ONCE AS NEEDED
Status: DISCONTINUED | OUTPATIENT
Start: 2024-10-19 | End: 2024-10-20

## 2024-10-19 RX ORDER — LABETALOL HYDROCHLORIDE 5 MG/ML
20 INJECTION, SOLUTION INTRAVENOUS ONCE AS NEEDED
Status: DISCONTINUED | OUTPATIENT
Start: 2024-10-19 | End: 2024-10-20

## 2024-10-19 RX ORDER — CARBOPROST TROMETHAMINE 250 UG/ML
250 INJECTION, SOLUTION INTRAMUSCULAR ONCE AS NEEDED
Status: DISCONTINUED | OUTPATIENT
Start: 2024-10-19 | End: 2024-10-20

## 2024-10-19 RX ORDER — TERBUTALINE SULFATE 1 MG/ML
0.25 INJECTION SUBCUTANEOUS ONCE AS NEEDED
Status: DISCONTINUED | OUTPATIENT
Start: 2024-10-19 | End: 2024-10-20

## 2024-10-19 RX ORDER — LIDOCAINE HCL/EPINEPHRINE/PF 2%-1:200K
VIAL (ML) INJECTION AS NEEDED
Status: DISCONTINUED | OUTPATIENT
Start: 2024-10-19 | End: 2024-10-19

## 2024-10-19 RX ORDER — MORPHINE SULFATE 0.5 MG/ML
INJECTION, SOLUTION EPIDURAL; INTRATHECAL; INTRAVENOUS AS NEEDED
Status: DISCONTINUED | OUTPATIENT
Start: 2024-10-19 | End: 2024-10-19

## 2024-10-19 RX ORDER — SODIUM CHLORIDE, SODIUM LACTATE, POTASSIUM CHLORIDE, CALCIUM CHLORIDE 600; 310; 30; 20 MG/100ML; MG/100ML; MG/100ML; MG/100ML
125 INJECTION, SOLUTION INTRAVENOUS CONTINUOUS
Status: DISCONTINUED | OUTPATIENT
Start: 2024-10-19 | End: 2024-10-20

## 2024-10-19 RX ORDER — NORETHINDRONE AND ETHINYL ESTRADIOL 0.5-0.035
KIT ORAL AS NEEDED
Status: DISCONTINUED | OUTPATIENT
Start: 2024-10-19 | End: 2024-10-19

## 2024-10-19 SDOH — SOCIAL STABILITY: SOCIAL INSECURITY: DO YOU FEEL ANYONE HAS EXPLOITED OR TAKEN ADVANTAGE OF YOU FINANCIALLY OR OF YOUR PERSONAL PROPERTY?: NO

## 2024-10-19 SDOH — HEALTH STABILITY: MENTAL HEALTH: HAVE YOU USED ANY SUBSTANCES (CANABIS, COCAINE, HEROIN, HALLUCINOGENS, INHALANTS, ETC.) IN THE PAST 12 MONTHS?: NO

## 2024-10-19 SDOH — SOCIAL STABILITY: SOCIAL INSECURITY: HAVE YOU HAD THOUGHTS OF HARMING ANYONE ELSE?: NO

## 2024-10-19 SDOH — SOCIAL STABILITY: SOCIAL INSECURITY: ABUSE SCREEN: ADULT

## 2024-10-19 SDOH — SOCIAL STABILITY: SOCIAL INSECURITY: VERBAL ABUSE: DENIES

## 2024-10-19 SDOH — SOCIAL STABILITY: SOCIAL INSECURITY: DOES ANYONE TRY TO KEEP YOU FROM HAVING/CONTACTING OTHER FRIENDS OR DOING THINGS OUTSIDE YOUR HOME?: NO

## 2024-10-19 SDOH — SOCIAL STABILITY: SOCIAL INSECURITY: PHYSICAL ABUSE: DENIES

## 2024-10-19 SDOH — HEALTH STABILITY: MENTAL HEALTH: WISH TO BE DEAD (PAST 1 MONTH): NO

## 2024-10-19 SDOH — ECONOMIC STABILITY: HOUSING INSECURITY: DO YOU FEEL UNSAFE GOING BACK TO THE PLACE WHERE YOU ARE LIVING?: NO

## 2024-10-19 SDOH — HEALTH STABILITY: MENTAL HEALTH: CURRENT SMOKER: 0

## 2024-10-19 SDOH — SOCIAL STABILITY: SOCIAL INSECURITY: ARE YOU OR HAVE YOU BEEN THREATENED OR ABUSED PHYSICALLY, EMOTIONALLY, OR SEXUALLY BY ANYONE?: NO

## 2024-10-19 SDOH — HEALTH STABILITY: MENTAL HEALTH: NON-SPECIFIC ACTIVE SUICIDAL THOUGHTS (PAST 1 MONTH): NO

## 2024-10-19 SDOH — SOCIAL STABILITY: SOCIAL INSECURITY: HAVE YOU HAD ANY THOUGHTS OF HARMING ANYONE ELSE?: NO

## 2024-10-19 SDOH — HEALTH STABILITY: MENTAL HEALTH: HAVE YOU USED ANY PRESCRIPTION DRUGS OTHER THAN PRESCRIBED IN THE PAST 12 MONTHS?: NO

## 2024-10-19 SDOH — SOCIAL STABILITY: SOCIAL INSECURITY: ARE THERE ANY APPARENT SIGNS OF INJURIES/BEHAVIORS THAT COULD BE RELATED TO ABUSE/NEGLECT?: NO

## 2024-10-19 SDOH — SOCIAL STABILITY: SOCIAL INSECURITY: HAS ANYONE EVER THREATENED TO HURT YOUR FAMILY OR YOUR PETS?: NO

## 2024-10-19 SDOH — HEALTH STABILITY: MENTAL HEALTH: WERE YOU ABLE TO COMPLETE ALL THE BEHAVIORAL HEALTH SCREENINGS?: YES

## 2024-10-19 SDOH — HEALTH STABILITY: MENTAL HEALTH: SUICIDAL BEHAVIOR (LIFETIME): NO

## 2024-10-19 ASSESSMENT — LIFESTYLE VARIABLES
SKIP TO QUESTIONS 9-10: 1
HOW OFTEN DO YOU HAVE 6 OR MORE DRINKS ON ONE OCCASION: NEVER
HOW OFTEN DO YOU HAVE A DRINK CONTAINING ALCOHOL: NEVER
HOW MANY STANDARD DRINKS CONTAINING ALCOHOL DO YOU HAVE ON A TYPICAL DAY: PATIENT DOES NOT DRINK
AUDIT-C TOTAL SCORE: 0
AUDIT-C TOTAL SCORE: 0

## 2024-10-19 ASSESSMENT — PATIENT HEALTH QUESTIONNAIRE - PHQ9
2. FEELING DOWN, DEPRESSED OR HOPELESS: NOT AT ALL
SUM OF ALL RESPONSES TO PHQ9 QUESTIONS 1 & 2: 0
1. LITTLE INTEREST OR PLEASURE IN DOING THINGS: NOT AT ALL

## 2024-10-19 ASSESSMENT — PAIN SCALES - GENERAL: PAIN_LEVEL: 0

## 2024-10-19 NOTE — H&P
OB Admission H&P    Assessment/Plan    Olga Christopher is a 34 y.o.  at 38w3d who is admitted for Labor  Had borderline blood pressure in office of 138/90.  Has been checking blood pressures at home and have been under 140/90 denies any neurological signs no abdominal pain.  No vaginal bleeding    Plan:    -Admit to L&D, consented  -T&S, CBC, and Syphilis  -Epidural at patient request  Hellp labs ordered  -Recheck as clinically indicated by maternal or fetal status    Fetal Status  -NST reactive, reassuring   -Presentation Vertex based on vaginal exam  -EFW 4000 g by ultrasound extrapolation  -GBS negative    Postpartum:   -Contraception Plan: patient declined    Pregnancy Problems (from 24 to present)       Problem Noted Diagnosed Resolved    Rh negative state in antepartum period ([facility]) 2024 by Celine Bernal MD  No    Priority:  Medium       Overview Addendum 2024  3:11 PM by Sherin Prather MD     She received Rhogam 2024 when she presented with light bleeding.  Next Rhogam is due 2024.          38 weeks gestation of pregnancy ([facility]) 3/29/2024 by Celine Bernal MD  No    Priority:  Medium       Overview Addendum 10/15/2024  1:42 PM by Aracelis Carpio RN     Desired provider in labor: [] CNM  [x] Physician  [x] Initial BMI: 23.49  [x] Prenatal Labs: 4/3/24  [x] Rh status: O NEG  [x] Genetic Screening:    [x] NT US: 24  [] Baby ASA (if indicated):  [x] Pregnancy dated by: LMP     [x] Anatomy US:   [] Patient added to BirthTracks  [x] 1hr GCT at 24-28wks: , result 118  [] 3 hr GTT (if indicated):  [x] Rhogam (if indicated): 24  [] Fetal Surveillance (if indicated):    [x] Tdap (27-36wks): 24  [x] RSV (32-36wks) ( Sept. To end of  ): 10/3/24  [x] Flu Shot: declined  [] COVID vaccine:      [x] Breastfeeding: education provided  [] Postpartum Birth control method:   [x] GBS at 36 - 37 wks: Negative   [x] 39 weeks discussion of IOL vs. Expectant  "management: IOL scheduled 10/25 @ [facility]   [x] Mode of delivery ( anticipated ): vaginal         Young multigravida in third trimester ([facility]) 3/29/2024 by Celine Bernal MD  No    Priority:  Medium       Vaginal discharge in pregnancy, second trimester ([facility]) 2024 by Celine Bernal MD  2024 by Celine Bernal MD            Subjective   Good fetal movement.  C/O of occasional contractions.    Prenatal Provider Dr Bernal    OB History    Para Term  AB Living   3 1 1 0 1 1   SAB IAB Ectopic Multiple Live Births   1 0 0 0 1      # Outcome Date GA Lbr Bradley/2nd Weight Sex Type Anes PTL Lv   3 Current            2 Term 21 39w0d  3.43 kg M Vag-Spont EPI N HOLLIE      Name: \"Khang\"   1 2020 6w0d    Complete          Past Surgical History:   Procedure Laterality Date    SPINE SURGERY  2017    Scoliosis       Social History     Tobacco Use    Smoking status: Never    Smokeless tobacco: Never   Substance Use Topics    Alcohol use: Not Currently       No Known Allergies    Medications Prior to Admission   Medication Sig Dispense Refill Last Dose/Taking    prenatal 21/iron fu/folic acid (PRENATAL COMPLETE ORAL) Take by mouth.        Objective     Last Vitals  Temp Pulse Resp BP MAP O2 Sat                   Blood Pressures    No data found in the last 1 encounters.          Physical Exam  General: NAD, mood appropriate  Cardiopulmonary: warm and well perfused, breathing comfortably on room air  Abdomen: Gravid, non-tender  Extremities: Symmetric  Speculum Exam: no pooling of fluid seen, deferred  Cervix: 6 /100 /-1      Fetal Monitoring  Baseline:  140 bpm, Variability: moderate,  Accelerations: present and Decelerations: none  Uterine Activity: Irregular contractions  Interpretation: Category 1    Bedside ultrasound: No    Labs in chart were reviewed.          Prenatal labs reviewed, not remarkable.        "

## 2024-10-20 VITALS
HEIGHT: 67 IN | SYSTOLIC BLOOD PRESSURE: 115 MMHG | DIASTOLIC BLOOD PRESSURE: 65 MMHG | WEIGHT: 185.41 LBS | RESPIRATION RATE: 18 BRPM | TEMPERATURE: 98.1 F | HEART RATE: 80 BPM | OXYGEN SATURATION: 100 % | BODY MASS INDEX: 29.1 KG/M2

## 2024-10-20 PROBLEM — O09.623 YOUNG MULTIGRAVIDA IN THIRD TRIMESTER (HHS-HCC): Status: RESOLVED | Noted: 2024-03-29 | Resolved: 2024-10-20

## 2024-10-20 PROBLEM — Z3A.38 38 WEEKS GESTATION OF PREGNANCY (HHS-HCC): Status: RESOLVED | Noted: 2024-03-29 | Resolved: 2024-10-20

## 2024-10-20 PROBLEM — Z98.891 STATUS POST EMERGENCY CESAREAN SECTION: Status: ACTIVE | Noted: 2024-10-20

## 2024-10-20 LAB
ALBUMIN SERPL BCP-MCNC: 2.9 G/DL (ref 3.4–5)
ALP SERPL-CCNC: 120 U/L (ref 33–110)
ALT SERPL W P-5'-P-CCNC: 7 U/L (ref 7–45)
ANION GAP SERPL CALC-SCNC: 12 MMOL/L (ref 10–20)
AST SERPL W P-5'-P-CCNC: 11 U/L (ref 9–39)
BASOPHILS # BLD AUTO: 0.04 X10*3/UL (ref 0–0.1)
BASOPHILS NFR BLD AUTO: 0.2 %
BB ANTIBODY IDENTIFICATION: NORMAL
BILIRUB SERPL-MCNC: 0.2 MG/DL (ref 0–1.2)
BUN SERPL-MCNC: 8 MG/DL (ref 6–23)
CALCIUM SERPL-MCNC: 8.2 MG/DL (ref 8.6–10.3)
CASE #: NORMAL
CHLORIDE SERPL-SCNC: 106 MMOL/L (ref 98–107)
CO2 SERPL-SCNC: 21 MMOL/L (ref 21–32)
CREAT SERPL-MCNC: 0.43 MG/DL (ref 0.5–1.05)
EGFRCR SERPLBLD CKD-EPI 2021: >90 ML/MIN/1.73M*2
EOSINOPHIL # BLD AUTO: 0.01 X10*3/UL (ref 0–0.7)
EOSINOPHIL NFR BLD AUTO: 0.1 %
ERYTHROCYTE [DISTWIDTH] IN BLOOD BY AUTOMATED COUNT: 13.6 % (ref 11.5–14.5)
ERYTHROCYTE [DISTWIDTH] IN BLOOD BY AUTOMATED COUNT: 13.8 % (ref 11.5–14.5)
FETAL MATERNAL SCREEN: NORMAL
GLUCOSE SERPL-MCNC: 125 MG/DL (ref 74–99)
HCT VFR BLD AUTO: 28.3 % (ref 36–46)
HCT VFR BLD AUTO: 33.3 % (ref 36–46)
HGB BLD-MCNC: 10.5 G/DL (ref 12–16)
HGB BLD-MCNC: 9.3 G/DL (ref 12–16)
IMM GRANULOCYTES # BLD AUTO: 0.18 X10*3/UL (ref 0–0.7)
IMM GRANULOCYTES NFR BLD AUTO: 1 % (ref 0–0.9)
LACTATE SERPL-SCNC: 3.4 MMOL/L (ref 0.4–2)
LYMPHOCYTES # BLD AUTO: 0.96 X10*3/UL (ref 1.2–4.8)
LYMPHOCYTES NFR BLD AUTO: 5.4 %
MCH RBC QN AUTO: 27.4 PG (ref 26–34)
MCH RBC QN AUTO: 28.4 PG (ref 26–34)
MCHC RBC AUTO-ENTMCNC: 31.5 G/DL (ref 32–36)
MCHC RBC AUTO-ENTMCNC: 32.9 G/DL (ref 32–36)
MCV RBC AUTO: 86 FL (ref 80–100)
MCV RBC AUTO: 87 FL (ref 80–100)
MONOCYTES # BLD AUTO: 0.71 X10*3/UL (ref 0.1–1)
MONOCYTES NFR BLD AUTO: 4 %
NEUTROPHILS # BLD AUTO: 15.79 X10*3/UL (ref 1.2–7.7)
NEUTROPHILS NFR BLD AUTO: 89.3 %
NRBC BLD-RTO: 0 /100 WBCS (ref 0–0)
NRBC BLD-RTO: 0 /100 WBCS (ref 0–0)
PLATELET # BLD AUTO: 142 X10*3/UL (ref 150–450)
PLATELET # BLD AUTO: 171 X10*3/UL (ref 150–450)
POTASSIUM SERPL-SCNC: 3.9 MMOL/L (ref 3.5–5.3)
PROT SERPL-MCNC: 4.9 G/DL (ref 6.4–8.2)
RBC # BLD AUTO: 3.28 X10*6/UL (ref 4–5.2)
RBC # BLD AUTO: 3.83 X10*6/UL (ref 4–5.2)
SODIUM SERPL-SCNC: 135 MMOL/L (ref 136–145)
TREPONEMA PALLIDUM IGG+IGM AB [PRESENCE] IN SERUM OR PLASMA BY IMMUNOASSAY: NONREACTIVE
WBC # BLD AUTO: 17.7 X10*3/UL (ref 4.4–11.3)
WBC # BLD AUTO: 18.5 X10*3/UL (ref 4.4–11.3)

## 2024-10-20 PROCEDURE — 36415 COLL VENOUS BLD VENIPUNCTURE: CPT | Performed by: ADVANCED PRACTICE MIDWIFE

## 2024-10-20 PROCEDURE — 85027 COMPLETE CBC AUTOMATED: CPT | Performed by: OBSTETRICS & GYNECOLOGY

## 2024-10-20 PROCEDURE — 2500000004 HC RX 250 GENERAL PHARMACY W/ HCPCS (ALT 636 FOR OP/ED): Performed by: OBSTETRICS & GYNECOLOGY

## 2024-10-20 PROCEDURE — 1100000001 HC PRIVATE ROOM DAILY

## 2024-10-20 PROCEDURE — 36415 COLL VENOUS BLD VENIPUNCTURE: CPT | Performed by: OBSTETRICS & GYNECOLOGY

## 2024-10-20 PROCEDURE — 2500000001 HC RX 250 WO HCPCS SELF ADMINISTERED DRUGS (ALT 637 FOR MEDICARE OP): Performed by: OBSTETRICS & GYNECOLOGY

## 2024-10-20 PROCEDURE — 85461 HEMOGLOBIN FETAL: CPT

## 2024-10-20 RX ORDER — LIDOCAINE 560 MG/1
1 PATCH PERCUTANEOUS; TOPICAL; TRANSDERMAL
Status: DISCONTINUED | OUTPATIENT
Start: 2024-10-20 | End: 2024-10-22 | Stop reason: HOSPADM

## 2024-10-20 RX ORDER — HYDRALAZINE HYDROCHLORIDE 20 MG/ML
5 INJECTION INTRAMUSCULAR; INTRAVENOUS ONCE AS NEEDED
Status: DISCONTINUED | OUTPATIENT
Start: 2024-10-20 | End: 2024-10-22 | Stop reason: HOSPADM

## 2024-10-20 RX ORDER — POLYETHYLENE GLYCOL 3350 17 G/17G
17 POWDER, FOR SOLUTION ORAL 2 TIMES DAILY PRN
Status: DISCONTINUED | OUTPATIENT
Start: 2024-10-20 | End: 2024-10-22 | Stop reason: HOSPADM

## 2024-10-20 RX ORDER — DIPHENHYDRAMINE HYDROCHLORIDE 50 MG/ML
25 INJECTION INTRAMUSCULAR; INTRAVENOUS EVERY 4 HOURS PRN
Status: DISCONTINUED | OUTPATIENT
Start: 2024-10-20 | End: 2024-10-22 | Stop reason: HOSPADM

## 2024-10-20 RX ORDER — OXYCODONE HYDROCHLORIDE 5 MG/1
10 TABLET ORAL EVERY 4 HOURS PRN
Status: DISCONTINUED | OUTPATIENT
Start: 2024-10-21 | End: 2024-10-22 | Stop reason: HOSPADM

## 2024-10-20 RX ORDER — HYDROMORPHONE HYDROCHLORIDE 1 MG/ML
0.2 INJECTION, SOLUTION INTRAMUSCULAR; INTRAVENOUS; SUBCUTANEOUS EVERY 5 MIN PRN
Status: DISCONTINUED | OUTPATIENT
Start: 2024-10-20 | End: 2024-10-22 | Stop reason: HOSPADM

## 2024-10-20 RX ORDER — LOPERAMIDE HYDROCHLORIDE 2 MG/1
4 CAPSULE ORAL EVERY 2 HOUR PRN
Status: DISCONTINUED | OUTPATIENT
Start: 2024-10-20 | End: 2024-10-22 | Stop reason: HOSPADM

## 2024-10-20 RX ORDER — DIPHENHYDRAMINE HCL 25 MG
25 CAPSULE ORAL EVERY 4 HOURS PRN
Status: DISCONTINUED | OUTPATIENT
Start: 2024-10-20 | End: 2024-10-22 | Stop reason: HOSPADM

## 2024-10-20 RX ORDER — ONDANSETRON 4 MG/1
4 TABLET, FILM COATED ORAL EVERY 6 HOURS PRN
Status: DISCONTINUED | OUTPATIENT
Start: 2024-10-20 | End: 2024-10-22 | Stop reason: HOSPADM

## 2024-10-20 RX ORDER — OXYTOCIN 10 [USP'U]/ML
10 INJECTION, SOLUTION INTRAMUSCULAR; INTRAVENOUS ONCE AS NEEDED
Status: DISCONTINUED | OUTPATIENT
Start: 2024-10-20 | End: 2024-10-20

## 2024-10-20 RX ORDER — SIMETHICONE 80 MG
80 TABLET,CHEWABLE ORAL 4 TIMES DAILY PRN
Status: DISCONTINUED | OUTPATIENT
Start: 2024-10-20 | End: 2024-10-22 | Stop reason: HOSPADM

## 2024-10-20 RX ORDER — KETOROLAC TROMETHAMINE 30 MG/ML
30 INJECTION, SOLUTION INTRAMUSCULAR; INTRAVENOUS EVERY 6 HOURS
Status: COMPLETED | OUTPATIENT
Start: 2024-10-20 | End: 2024-10-20

## 2024-10-20 RX ORDER — NALOXONE HYDROCHLORIDE 0.4 MG/ML
0.1 INJECTION, SOLUTION INTRAMUSCULAR; INTRAVENOUS; SUBCUTANEOUS EVERY 5 MIN PRN
Status: DISCONTINUED | OUTPATIENT
Start: 2024-10-20 | End: 2024-10-22 | Stop reason: HOSPADM

## 2024-10-20 RX ORDER — NIFEDIPINE 10 MG/1
10 CAPSULE ORAL ONCE AS NEEDED
Status: DISCONTINUED | OUTPATIENT
Start: 2024-10-20 | End: 2024-10-22 | Stop reason: HOSPADM

## 2024-10-20 RX ORDER — CARBOPROST TROMETHAMINE 250 UG/ML
250 INJECTION, SOLUTION INTRAMUSCULAR ONCE AS NEEDED
Status: DISCONTINUED | OUTPATIENT
Start: 2024-10-20 | End: 2024-10-22 | Stop reason: HOSPADM

## 2024-10-20 RX ORDER — LABETALOL HYDROCHLORIDE 5 MG/ML
20 INJECTION, SOLUTION INTRAVENOUS ONCE AS NEEDED
Status: DISCONTINUED | OUTPATIENT
Start: 2024-10-20 | End: 2024-10-22 | Stop reason: HOSPADM

## 2024-10-20 RX ORDER — TRANEXAMIC ACID 100 MG/ML
1000 INJECTION, SOLUTION INTRAVENOUS ONCE AS NEEDED
Status: DISCONTINUED | OUTPATIENT
Start: 2024-10-20 | End: 2024-10-22 | Stop reason: HOSPADM

## 2024-10-20 RX ORDER — METHYLERGONOVINE MALEATE 0.2 MG/ML
0.2 INJECTION INTRAVENOUS ONCE AS NEEDED
Status: DISCONTINUED | OUTPATIENT
Start: 2024-10-20 | End: 2024-10-22 | Stop reason: HOSPADM

## 2024-10-20 RX ORDER — ADHESIVE BANDAGE
10 BANDAGE TOPICAL
Status: DISCONTINUED | OUTPATIENT
Start: 2024-10-20 | End: 2024-10-22 | Stop reason: HOSPADM

## 2024-10-20 RX ORDER — IBUPROFEN 600 MG/1
600 TABLET ORAL EVERY 6 HOURS
Status: DISCONTINUED | OUTPATIENT
Start: 2024-10-20 | End: 2024-10-22 | Stop reason: HOSPADM

## 2024-10-20 RX ORDER — ONDANSETRON HYDROCHLORIDE 2 MG/ML
4 INJECTION, SOLUTION INTRAVENOUS EVERY 6 HOURS PRN
Status: DISCONTINUED | OUTPATIENT
Start: 2024-10-20 | End: 2024-10-22 | Stop reason: HOSPADM

## 2024-10-20 RX ORDER — BISACODYL 10 MG/1
10 SUPPOSITORY RECTAL DAILY PRN
Status: DISCONTINUED | OUTPATIENT
Start: 2024-10-20 | End: 2024-10-22 | Stop reason: HOSPADM

## 2024-10-20 RX ORDER — FERROUS SULFATE 325(65) MG
65 TABLET ORAL
Status: DISCONTINUED | OUTPATIENT
Start: 2024-10-20 | End: 2024-10-22 | Stop reason: HOSPADM

## 2024-10-20 RX ORDER — OXYCODONE HYDROCHLORIDE 5 MG/1
5 TABLET ORAL EVERY 4 HOURS PRN
Status: DISCONTINUED | OUTPATIENT
Start: 2024-10-21 | End: 2024-10-22 | Stop reason: HOSPADM

## 2024-10-20 RX ORDER — OXYTOCIN/0.9 % SODIUM CHLORIDE 30/500 ML
60 PLASTIC BAG, INJECTION (ML) INTRAVENOUS ONCE AS NEEDED
Status: DISCONTINUED | OUTPATIENT
Start: 2024-10-20 | End: 2024-10-20

## 2024-10-20 RX ORDER — MISOPROSTOL 200 UG/1
800 TABLET ORAL ONCE AS NEEDED
Status: DISCONTINUED | OUTPATIENT
Start: 2024-10-20 | End: 2024-10-22 | Stop reason: HOSPADM

## 2024-10-20 RX ORDER — ACETAMINOPHEN 325 MG/1
975 TABLET ORAL EVERY 6 HOURS
Status: DISCONTINUED | OUTPATIENT
Start: 2024-10-20 | End: 2024-10-22 | Stop reason: HOSPADM

## 2024-10-20 SDOH — ECONOMIC STABILITY: FOOD INSECURITY: WITHIN THE PAST 12 MONTHS, YOU WORRIED THAT YOUR FOOD WOULD RUN OUT BEFORE YOU GOT THE MONEY TO BUY MORE.: NEVER TRUE

## 2024-10-20 SDOH — ECONOMIC STABILITY: FOOD INSECURITY: WITHIN THE PAST 12 MONTHS, THE FOOD YOU BOUGHT JUST DIDN'T LAST AND YOU DIDN'T HAVE MONEY TO GET MORE.: NEVER TRUE

## 2024-10-20 SDOH — SOCIAL STABILITY: SOCIAL INSECURITY: WITHIN THE LAST YEAR, HAVE YOU BEEN HUMILIATED OR EMOTIONALLY ABUSED IN OTHER WAYS BY YOUR PARTNER OR EX-PARTNER?: NO

## 2024-10-20 SDOH — SOCIAL STABILITY: SOCIAL INSECURITY
WITHIN THE LAST YEAR, HAVE YOU BEEN RAPED OR FORCED TO HAVE ANY KIND OF SEXUAL ACTIVITY BY YOUR PARTNER OR EX-PARTNER?: NO

## 2024-10-20 SDOH — HEALTH STABILITY: PHYSICAL HEALTH
HOW OFTEN DO YOU NEED TO HAVE SOMEONE HELP YOU WHEN YOU READ INSTRUCTIONS, PAMPHLETS, OR OTHER WRITTEN MATERIAL FROM YOUR DOCTOR OR PHARMACY?: NEVER

## 2024-10-20 SDOH — SOCIAL STABILITY: SOCIAL INSECURITY: WITHIN THE LAST YEAR, HAVE YOU BEEN AFRAID OF YOUR PARTNER OR EX-PARTNER?: NO

## 2024-10-20 SDOH — SOCIAL STABILITY: SOCIAL INSECURITY
WITHIN THE LAST YEAR, HAVE YOU BEEN KICKED, HIT, SLAPPED, OR OTHERWISE PHYSICALLY HURT BY YOUR PARTNER OR EX-PARTNER?: NO

## 2024-10-20 ASSESSMENT — PAIN SCALES - GENERAL
PAINLEVEL_OUTOF10: 0 - NO PAIN
PAINLEVEL_OUTOF10: 0 - NO PAIN
PAINLEVEL_OUTOF10: 1
PAINLEVEL_OUTOF10: 0 - NO PAIN
PAINLEVEL_OUTOF10: 3
PAINLEVEL_OUTOF10: 4
PAINLEVEL_OUTOF10: 0 - NO PAIN
PAINLEVEL_OUTOF10: 0 - NO PAIN

## 2024-10-20 ASSESSMENT — PAIN DESCRIPTION - DESCRIPTORS: DESCRIPTORS: SORE

## 2024-10-20 ASSESSMENT — ACTIVITIES OF DAILY LIVING (ADL): LACK_OF_TRANSPORTATION: NO

## 2024-10-20 NOTE — L&D DELIVERY NOTE
OB Delivery Note  10/19/2024  Olga Christopher  34 y.o.   , Low Transverse       Gestational Age: 38w3d  /Para:   Quantitative Blood Loss: Admission to Discharge: 0 mL (10/19/2024  6:52 PM - 10/19/2024 10:35 PM)    Debby Christopher [[id number]]      Labor Events    Rupture date/time: 10/19/2024 2016  Rupture type: Artificial  Fluid color: Meconium  Fluid odor: None  Complications: Fetal Intolerance       Labor Event Times    Dilation complete date/time: 10/19/2024 2015       Placenta    Placenta delivery date/time:   Placenta removal: Expressed  Placenta appearance: Intact  Placenta disposition: pathology       Cord    Vessels: 3 vessels  Complications: None  Delayed cord clamping?: Yes  Cord blood disposition: Lab  Gases sent?: Yes  Stem cell collection (by provider): No       Anesthesia    Method: Epidural       Shoulder Dystocia    Shoulder dystocia present?: No       [place] Delivery    Birth date/time: 10/19/2024 20:57:00  Delivery type: , Low Transverse   categorization: primary   priority: urgent  Indications for : Fetal Intolerance of Labor  Incision type: low transverse  Complications: Fetal Intolerance       Resuscitation    Method: None       Apgars    Living status: Living  Apgar Component Scores:  1 min.:  5 min.:  10 min.:  15 min.:  20 min.:    Skin color:  1  1       Heart rate:  2  2       Reflex irritability:  2  2       Muscle tone:  2  2       Respiratory effort:  2  2       Total:  9  9       Apgars assigned by: COLTEN GOODWIN RN       Delivery Providers    Delivering clinician: Himanshu Jones MD   Provider Role     Delivery Nurse    Jane Goodwin RN Nursery Nurse     Resident                     Himanshu Jones MD

## 2024-10-20 NOTE — ANESTHESIA POSTPROCEDURE EVALUATION
Patient: Olga Christopher    Procedure Summary       Date: 10/19/24 Room / Location:     Anesthesia Start: 2044 Anesthesia Stop: 2150    Procedure: Labor Analgesia Diagnosis:     Scheduled Providers:  Responsible Provider: MICHELLE Atkins    Anesthesia Type: epidural ASA Status: 2            Anesthesia Type: epidural    Vitals Value Taken Time   BP 94/51 10/19/24 2204   Temp  10/19/24 2205   Pulse 65 10/19/24 2204   Resp  10/19/24 2205   SpO2 95 % 10/19/24 2203       Anesthesia Post Evaluation    Patient location during evaluation: bedside  Patient participation: complete - patient participated  Level of consciousness: awake and alert  Pain score: 0  Pain management: adequate  Multimodal analgesia pain management approach  Airway patency: patent  Two or more strategies used to mitigate risk of obstructive sleep apnea  Cardiovascular status: acceptable  Respiratory status: acceptable  Hydration status: acceptable  Postoperative Nausea and Vomiting: none        No notable events documented.

## 2024-10-20 NOTE — OP NOTE
Date: 10/19/2024  OR Location: [facility] OB    Name: Olga Christopher, : 1990, Age: 34 y.o., MRN: [id number], Sex: female    Diagnosis  38-week pregnancy  Nonreassuring fetal heart rate 38-week pregnancy  Nonreassuring fetal heart rate     Procedures    * OBGYN Delivery  Section    Surgeons      * Himanshu Jones - Primary    Resident/Fellow/Other Assistant:  Surgeons and Role:  * No surgeons found with a matching role *    Procedure Summary  Anesthesia: Anesthesia type not filed in the log.  ASA: ASA status not filed in the log.  Anesthesia Staff: No anesthesia staff entered.  Estimated Blood Loss: 800 mL  Intra-op Medications: * Intraprocedure medication information is unavailable because the case start and end events have not been set *      Intraprocedure I/O Totals       None           Specimen: No specimens collected     Staff:   Scrub Person: Inés  Circulator: Lizette         Informed Consent:  The risks, benefits, complications, and alternatives were discussed with the patient. The patient understood that the risks of  section include, but are not limited to: injury to nearby structures or organs, infection, blood loss and possible need for transfusion, and potential need for more surgery including hysterectomy. The patient stated understanding and desired to proceed. All questions were answered. The site of surgery was properly noted and marked. The patient was identified as Olga Christopher and the procedure verified as a  delivery. A Time Out was held and the above information confirmed.    Procedure Details:  The patient was taken to the operating room where   anesthesia was found to be adequate. Antibiotics were given for infection prophylaxis. She was prepped and draped in the normal sterile fashion in the dorsal supine position with leftward tilt. A Pfannenstiel skin incision was made with scalpel. The incision was carried down to the fascia . The fascia was incised and  extended laterally with blunt dissection. The inferior aspect of the fascia was grasped with Kocher clamps. The underlying rectus and pyramidalis muscles were dissected .. The rectus muscles were  sharply down the midline down to the level of the pubic symphysis. The peritoneum was identified and entered bluntly. Peritoneal incision was extended superiorly and inferiorly with good visualization of the bladder.    The bladder blade was inserted, vesicouterine peritoneum was identified, and bladder flap created bluntly. The lower uterine segment was then incised with a low transverse incision and was extended bluntly. The amniotic sac was ruptured and Meconium color noted. The bladder blade was removed and the infant was delivered atraumatically using the usual maneuvers. The remainder of the infant was delivered, the cord clamped and cut, and the baby was handed off to the awaiting clinicians.     The placenta was removed via manual massage. The uterus was then exteriorized and cleared of all clots and debris. The hysterotomy was repaired with suture of 0 Vicryl in a running locked fashion. A second imbricating layer of the same suture was placed and good hemostasis observed. The ovaries and tubes appeared normal bilaterally. The uterus, tubes, and ovaries were then returned to the abdomen and pelvis. The uterine incision was reinspected and found to be hemostatic. The subfascial spaces were inspected and noted to be hemostatic. The fascia was then reapproximated with two running sutures of 0 Vicryl tied at the midline. The skin was closed with 4-0 Vicryl sutures.    The patient tolerated the procedure well. Sponge, instrument, and needle counts were correct and the patient was taken to the recovery room in good and stable condition.    Complications:  None; patient tolerated the procedure well.     Disposition: PACU - hemodynamically stable.  Condition: stable

## 2024-10-20 NOTE — CARE PLAN
The patient's goals for the shift include  bond with infant    The clinical goals for the shift include maintain safety    Over the shift, the patient did make progress toward the following goals.     Problem: Postpartum  Goal: Experiences normal postpartum course  10/20/2024 0648 by Tom Barboza RN  Outcome: Progressing  10/20/2024 0340 by Tom Barboza RN  Outcome: Progressing  Goal: Appropriate maternal -  bonding  10/20/2024 0648 by Tom Barboza RN  Outcome: Progressing  10/20/2024 0340 by Tom Barboza RN  Outcome: Progressing  Goal: Establish and maintain infant feeding pattern for adequate nutrition  10/20/2024 0648 by Tom Barboza RN  Outcome: Progressing  10/20/2024 0340 by Tom Barboza RN  Outcome: Progressing  Goal: Incisions, wounds, or drain sites healing without S/S of infection  10/20/2024 0648 by Tom Barboza RN  Outcome: Progressing  10/20/2024 0340 by Tom Barboza RN  Outcome: Progressing  Goal: No s/sx infection  10/20/2024 0648 by Tom Barboza RN  Outcome: Progressing  10/20/2024 0340 by Tom Barboza RN  Outcome: Progressing  Goal: No s/sx of hemorrhage  10/20/2024 0648 by Tom Barboza RN  Outcome: Progressing  10/20/2024 0340 by Tom Barboza RN  Outcome: Progressing  Goal: Minimal s/sx of HDP and BP<160/110  10/20/2024 0648 by Tom Barboza RN  Outcome: Progressing  10/20/2024 0340 by Tom Barboza RN  Outcome: Progressing

## 2024-10-20 NOTE — ANESTHESIA PREPROCEDURE EVALUATION
Patient: Olga Christopher    Evaluation Method: In-person visit    Procedure Information    Anesthesia Start Date/Time: 10/19/24 2044  Procedure: Labor Analgesia         Relevant Problems   Musculoskeletal   (+) Idiopathic scoliosis and kyphoscoliosis   (+) Idiopathic scoliosis of thoracolumbar spine      GYN   (+) 38 weeks gestation of pregnancy ([facility])   (+) Young multigravida in third trimester ([facility])       Clinical information reviewed:    Allergies   Problems              NPO Detail:  No data recorded     OB/Gyn Evaluation    Present Pregnancy    Patient is pregnant now.   Obstetric History                Physical Exam    Airway  Mallampati: II  TM distance: >3 FB     Cardiovascular - normal exam  Rhythm: regular  Rate: normal     Dental - normal exam     Pulmonary - normal exam     Abdominal - normal exam             Anesthesia Plan    History of general anesthesia?: yes  History of complications of general anesthesia?: no    ASA 2     epidural     The patient is not a current smoker.  Patient was previously instructed to abstain from smoking on day of procedure.  Patient did not smoke on day of procedure.  Education provided regarding risk of obstructive sleep apnea.  Anesthetic plan and risks discussed with patient.    Plan discussed with CRNA.

## 2024-10-20 NOTE — CARE PLAN
The patient's goals for the shift include  safe delivery    The clinical goals for the shift include  maintain safety    Over the shift, the patient did make progress toward the following goals.    Problem: Vaginal Birth or  Section  Goal: Fetal and maternal status remain reassuring during the birth process  Outcome: Met  Goal: Tolerate CRB for IOL placement maintenance until dislodgement/removal 12hrs after placement  Outcome: Met  Goal: Prevention of malpresentation/labor dystocia through delivery  Outcome: Met  Goal: Demonstrates labor coping techniques through delivery  Outcome: Met  Goal: Minimal s/sx of HDP and BP<160/110  Outcome: Met  Goal: No s/sx of infection through recovery  Outcome: Met  Goal: No s/sx of hemorrhage through recovery  Outcome: Met     Problem: Pain - Adult  Goal: Verbalizes/displays adequate comfort level or baseline comfort level  Outcome: Met     Problem: Safety - Adult  Goal: Free from fall injury  Outcome: Met

## 2024-10-20 NOTE — ANESTHESIA PROCEDURE NOTES
Epidural Block    Patient location during procedure: OB  Start time: 10/19/2024 8:00 PM  End time: 10/19/2024 8:15 PM  Reason for block: primary anesthetic and labor analgesia  Staffing  Performed: CRNA   Authorized by: MICHELLE Atkins    Performed by: MICHELLE Atkins    Preanesthetic Checklist  Completed: patient identified, IV checked, risks and benefits discussed, surgical consent, pre-op evaluation, timeout performed and sterile techniques followed  Block Timeout  RN/Licensed healthcare professional reads aloud to the Anesthesia provider and entire team: Patient identity, procedure with side and site, patient position, and as applicable the availability of implants/special equipment/special requirements.  Patient on coagulant treatment: no  Timeout performed at: 10/19/2024 8:00 PM  Block Placement  Patient position: sitting  Prep: ChloraPrep  Sterility prep: cap, drape, gloves, hand and mask  Sedation level: no sedation  Patient monitoring: blood pressure, continuous pulse oximetry, EKG and heart rate  Approach: midline  Local numbing: lidocaine 1% to skin and subcutaneous tissues  Epidural  Loss of resistance technique: air  Guidance: landmark technique        Needle  Needle type: Tuohy   Needle gauge: 17  Needle length: 9 cm  Needle insertion depth: 6 cm  Catheter type: multi-orifice  Catheter size: 19 G  Catheter at skin depth: 12 cm  Catheter securement method: clear occlusive dressing and liquid medical adhesive    Test dose result: no positive test dose    PCEA  Medication concentration used: 0.2% Ropivacaine with 2 mcg/mL Fentanyl  Dose (mL): 5  Lockout (minutes): 20  1-Hour Limit (boluses/hr): 24  Basal Rate: 10        Assessment  Sensory level: T10  Block outcome: pain improved  Number of attempts: 1  Events: no positive test dose  Procedure assessment: patient tolerated procedure well with no immediate complications

## 2024-10-20 NOTE — PROGRESS NOTES
Postpartum Progress Note    Assessment/Plan   Olga Christopher is a 34 y.o., , who delivered at 38w3d gestation and is now postpartum day 1.  S/p emergency LTCS for non-reassuring FHT  Pain and bleeding , both well controlled.  No flatus yet.  Postoperative anemia, asymptomatic, will start on oral iron.  Rh negative, with Rh positive , Rhogam ordered    Assessment & Plan  Antepartum non-reassuring fetal heart rate or rhythm affecting care of mother    Status post emergency  section    Pregnancy Problems (from 24 to present)       Problem Noted Diagnosed Resolved    Rh negative state in antepartum period ([facility]) 2024 by Celine Bernal MD  No    Priority:  Medium       Overview Addendum 2024  3:11 PM by Sherin Prather MD     She received Rhogam 2024 when she presented with light bleeding.  Next Rhogam is due 2024.          38 weeks gestation of pregnancy ([facility]) 3/29/2024 by Celine Bernal MD  No    Priority:  Medium       Overview Addendum 10/15/2024  1:42 PM by Aracelis Carpio RN     Desired provider in labor: [] CNM  [] Physician  [x] Initial BMI: 23.49  [x] Prenatal Labs: 4/3/24  [x] Rh status: O NEG  [x] Genetic Screening:    [x] NT US: 24  [] Baby ASA (if indicated):  [x] Pregnancy dated by: LMP     [x] Anatomy US:   [] Patient added to BirthTracks  [x] 1hr GCT at 24-28wks: , result 118  [] 3 hr GTT (if indicated):  [x] Rhogam (if indicated): 24  [] Fetal Surveillance (if indicated):    [x] Tdap (27-36wks): 24  [x] RSV (32-36wks) ( Sept. To end of  ): 10/3/24  [x] Flu Shot: declined  [] COVID vaccine:      [x] Breastfeeding: education provided  [] Postpartum Birth control method:   [x] GBS at 36 - 37 wks: Negative   [x] 39 weeks discussion of IOL vs. Expectant management: IOL scheduled 10/25 @ [facility]   [x] Mode of delivery ( anticipated ): vaginal         Young multigravida in third trimester ([facility]) 3/29/2024 by Celine Bernal MD  No     Priority:  Medium       Vaginal discharge in pregnancy, second trimester ([facility]) 2024 by Celine Bernal MD  2024 by Celine Bernal MD    Priority:  Medium             Hospital course: no complications   section delivery  Patient is not breastfeedingThe patient's blood type is O NEG. The baby's blood type is O POS. Rhogam is indicated.    Subjective   Her pain is well controlled with current medications  She is not passing flatus  She is ambulating well  She is tolerating a Adult diet Regular  She reports no breast or nursing problems  She denies emotional concerns today   Her plan for contraception is none         Objective   Allergies:   Patient has no known allergies.         Last Vitals:  Temp Pulse Resp BP MAP Pulse Ox   37.1 °C (98.8 °F) 67 18 102/52 63 100 %     Vitals Min/Max Last 24 Hours:  Temp  Min: 35.3 °C (95.5 °F)  Max: 37.1 °C (98.8 °F)  Pulse  Min: 52  Max: 95  Resp  Min: 18  Max: 18  BP  Min: 72/36  Max: 147/79  MAP (mmHg)  Min: 48  Max: 105    Intake/Output:     Intake/Output Summary (Last 24 hours) at 10/20/2024 0831  Last data filed at 10/20/2024 0445  Gross per 24 hour   Intake --   Output 2550 ml   Net -2550 ml       Physical Exam:  General: Examination reveals a well developed, well nourished, female, in no acute distress. She is alert and cooperative.  HEENT: PERRLA.  Incision: Dressing is intact.  Fundus: at umbilicus.  Extremities: no redness or tenderness in the calves or thighs, no edema.  Neurological: alert, oriented, normal speech, no focal findings or movement disorder noted.  Psychological: awake and alert; oriented to person, place, and time.    Lab Data:  Labs in chart were reviewed.  Lab Results   Component Value Date    WBC 18.5 (H) 10/20/2024    HGB 9.3 (L) 10/20/2024    HCT 28.3 (L) 10/20/2024     (L) 10/20/2024

## 2024-10-20 NOTE — SIGNIFICANT EVENT
S patient feeling well.  Not having any pain.  No dizziness or lightheadedness.  No trouble breathing.  No shortness of breath no chest pain    O vital signs reviewed.  Patient noted to have low blood pressures since onset of  postanesthesia.  Most recent .  Patient's pulse has been steady between 61 and 66 strong.  Urine output 75 cc in the last hour last recorded temperature 36.4  Abdomen soft without distention dressing is dry no tenderness minimal vaginal bleeding noted exam negative urine is clear    A patient is stable.  Sepsis alert was triggered but have very low suspicion.  Patient currently has a white count of only 12,000, temperature is above 36, respirations are 18, pulse is 66.  Blood pressure is low but has been low since receiving epidural for  section bolus.  Current exam is unremarkable.  Will plan on ordering blood work as a precaution.  Will recheck once results are back    P reevaluate when results are back continue close monitoring for now

## 2024-10-21 ENCOUNTER — PHARMACY VISIT (OUTPATIENT)
Dept: PHARMACY | Facility: CLINIC | Age: 34
End: 2024-10-21
Payer: COMMERCIAL

## 2024-10-21 PROCEDURE — 2500000001 HC RX 250 WO HCPCS SELF ADMINISTERED DRUGS (ALT 637 FOR MEDICARE OP): Performed by: OBSTETRICS & GYNECOLOGY

## 2024-10-21 PROCEDURE — 1100000001 HC PRIVATE ROOM DAILY

## 2024-10-21 PROCEDURE — 2500000004 HC RX 250 GENERAL PHARMACY W/ HCPCS (ALT 636 FOR OP/ED): Performed by: ADVANCED PRACTICE MIDWIFE

## 2024-10-21 PROCEDURE — RXMED WILLOW AMBULATORY MEDICATION CHARGE

## 2024-10-21 RX ORDER — FERROUS SULFATE 325(65) MG
65 TABLET ORAL
Qty: 30 TABLET | Refills: 0 | Status: SHIPPED | OUTPATIENT
Start: 2024-10-21 | End: 2024-11-20

## 2024-10-21 RX ORDER — AMOXICILLIN 250 MG
1 CAPSULE ORAL DAILY
Qty: 14 TABLET | Refills: 0 | Status: SHIPPED | OUTPATIENT
Start: 2024-10-21 | End: 2024-11-04

## 2024-10-21 RX ORDER — LIDOCAINE 560 MG/1
1 PATCH PERCUTANEOUS; TOPICAL; TRANSDERMAL
Qty: 7 PATCH | Refills: 3 | Status: SHIPPED | OUTPATIENT
Start: 2024-10-21 | End: 2024-11-20

## 2024-10-21 RX ORDER — IBUPROFEN 600 MG/1
600 TABLET ORAL EVERY 6 HOURS
Qty: 56 TABLET | Refills: 0 | Status: SHIPPED | OUTPATIENT
Start: 2024-10-21 | End: 2024-11-04

## 2024-10-21 RX ORDER — ACETAMINOPHEN 325 MG/1
975 TABLET ORAL EVERY 6 HOURS
Qty: 168 TABLET | Refills: 0 | Status: SHIPPED | OUTPATIENT
Start: 2024-10-21 | End: 2024-11-04

## 2024-10-21 ASSESSMENT — PAIN SCALES - GENERAL
PAINLEVEL_OUTOF10: 0 - NO PAIN
PAINLEVEL_OUTOF10: 1
PAINLEVEL_OUTOF10: 2
PAINLEVEL_OUTOF10: 2
PAINLEVEL_OUTOF10: 4

## 2024-10-21 ASSESSMENT — PAIN DESCRIPTION - LOCATION
LOCATION: ABDOMEN
LOCATION: ABDOMEN

## 2024-10-21 ASSESSMENT — PAIN DESCRIPTION - DESCRIPTORS: DESCRIPTORS: CRAMPING;SORE;TENDER

## 2024-10-21 ASSESSMENT — PAIN SCALES - PAIN ASSESSMENT IN ADVANCED DEMENTIA (PAINAD): TOTALSCORE: MEDICATION (SEE MAR)

## 2024-10-21 NOTE — CARE PLAN
Problem: Pain - Adult  Goal: Verbalizes/displays adequate comfort level or baseline comfort level  Outcome: Progressing     Problem: Postpartum  Goal: Experiences normal postpartum course  Outcome: Progressing  Goal: Appropriate maternal -  bonding  Outcome: Progressing  Goal: Establish and maintain infant feeding pattern for adequate nutrition  Outcome: Progressing  Goal: Incisions, wounds, or drain sites healing without S/S of infection  Outcome: Progressing  Goal: No s/sx infection  Outcome: Progressing  Goal: No s/sx of hemorrhage  Outcome: Progressing  Goal: Minimal s/sx of HDP and BP<160/110  Outcome: Progressing

## 2024-10-21 NOTE — PROGRESS NOTES
Postpartum Progress Note    Assessment/Plan   Olga Christopher is a 34 y.o., , who delivered at 38w3d gestation and is now postpartum day 2.      Assessment & Plan  Antepartum non-reassuring fetal heart rate or rhythm affecting care of mother    Status post emergency  section    Pregnancy Problems (from 24 to present)       Problem Noted Diagnosed Resolved    Rh negative state in antepartum period ([facility]) 2024 by Celine Bernal MD  No    Priority:  Medium       Overview Addendum 2024  3:11 PM by Sherin Prather MD     She received Rhogam 2024 when she presented with light bleeding.  Next Rhogam is due 2024.          Vaginal discharge in pregnancy, second trimester ([facility]) 2024 by Celine Bernal MD  2024 by Celine Bernal MD    Priority:  Medium       38 weeks gestation of pregnancy ([facility]) 3/29/2024 by Celine Bernal MD  10/20/2024 by Celine Bernal MD    Priority:  Medium       Overview Addendum 10/15/2024  1:42 PM by Aracelis Carpio RN     Desired provider in labor: [] CNM  [] Physician  [x] Initial BMI: 23.49  [x] Prenatal Labs: 4/3/24  [x] Rh status: O NEG  [x] Genetic Screening:    [x] NT US: 24  [] Baby ASA (if indicated):  [x] Pregnancy dated by: LMP     [x] Anatomy US:   [] Patient added to BirthTracks  [x] 1hr GCT at 24-28wks: , result 118  [] 3 hr GTT (if indicated):  [x] Rhogam (if indicated): 24  [] Fetal Surveillance (if indicated):    [x] Tdap (27-36wks): 24  [x] RSV (32-36wks) ( Sept. To end of  ): 10/3/24  [x] Flu Shot: declined  [] COVID vaccine:      [x] Breastfeeding: education provided  [] Postpartum Birth control method:   [x] GBS at 36 - 37 wks: Negative   [x] 39 weeks discussion of IOL vs. Expectant management: IOL scheduled 10/25 @ [facility]   [x] Mode of delivery ( anticipated ): vaginal         Young multigravida in third trimester ([facility]) 3/29/2024 by Celine Bernal MD  10/20/2024 by Celine Bernal MD     Priority:  Medium             Hospital course: no complications   section delivery  Patient is not breastfeedingThe patient's blood type is O NEG. The baby's blood type is O POS. Rhogam indicated and given.    Subjective   Her pain is well controlled with current medications  She is passing flatus  She is ambulating well  She is tolerating a Adult diet Regular  She reports no breast or nursing problems  She denies emotional concerns today   Her plan for contraception is none -> planning 6W PP TL  Mepelex dressing in place with what appears to be old shadowing along the central bottom. It was marked and edges confirmed to be intact.      Pt is feeling well and has no concerns. She notices some incisional pain that is moderately managed; we reviewed options for pain intervention. She has a couple of questions that were answered to her satisfaction. She denies HA, blurred vision, chest or RUQ pain, or urinary complaints. We reviewed dressing/wound care, normal vs abnormal recovery, incision check within one week, and resources. She declines contraception. We reviewed her Bps and she notes the elevated BP from last night (162/83) was charted in error and not actually hers. Anticipate discharge tomorrow.      Objective   Allergies:   Patient has no known allergies.         Last Vitals:  Temp Pulse Resp BP MAP Pulse Ox   36.4 °C (97.5 °F) 71 18 121/74 86 98 %     Vitals Min/Max Last 24 Hours:  Temp  Min: 36.4 °C (97.5 °F)  Max: 37.2 °C (99 °F)  Pulse  Min: 67  Max: 94  Resp  Min: 16  Max: 18  BP  Min: 104/63  Max: 162/83  MAP (mmHg)  Min: 71  Max: 100    Intake/Output:     Intake/Output Summary (Last 24 hours) at 10/21/2024 1100  Last data filed at 10/20/2024 2030  Gross per 24 hour   Intake --   Output 1000 ml   Net -1000 ml       Physical Exam:  General: Examination reveals a well developed, well nourished, female, in no acute distress. She is alert and cooperative.  HEENT: PERRLA. External ears normal. Nose  normal, no erythema or discharge. Mouth and throat clear.  Neck: supple, no significant adenopathy.  Lungs: clear to auscultation bilaterally.  Cardiac: regular rate and rhythm, S1, S2 normal, no murmur, click, rub or gallop.  Breasts: lactating, no erythema or tenderness, nipples normal.  Abdomen: soft, no palpable masses, BS+.  Incision: tenderness with palpation, dressing with some old shadowing but otherwise dry and intact.  Fundus: firm and 1FW below umbilicus.  Perineum: deferred.  Extremities: no redness or tenderness in the calves or thighs, no edema.  Neurological: alert, oriented, normal speech, no focal findings or movement disorder noted.  Psychological: awake and alert; oriented to person, place, and time.    Lab Data:  Labs in chart were reviewed.

## 2024-10-22 VITALS
OXYGEN SATURATION: 96 % | HEIGHT: 67 IN | BODY MASS INDEX: 29.1 KG/M2 | HEART RATE: 63 BPM | TEMPERATURE: 99.7 F | WEIGHT: 185.41 LBS | DIASTOLIC BLOOD PRESSURE: 73 MMHG | RESPIRATION RATE: 18 BRPM | SYSTOLIC BLOOD PRESSURE: 118 MMHG

## 2024-10-22 PROCEDURE — 2500000001 HC RX 250 WO HCPCS SELF ADMINISTERED DRUGS (ALT 637 FOR MEDICARE OP): Performed by: OBSTETRICS & GYNECOLOGY

## 2024-10-22 ASSESSMENT — PAIN SCALES - GENERAL: PAINLEVEL_OUTOF10: 2

## 2024-10-22 NOTE — CARE PLAN
The patient's goals for the shift include infant care and bonding    The clinical goals for the shift include remain free of injury during shift    Over the shift, the patient did not make progress toward the following goals. Barriers to progression include ***. Recommendations to address these barriers include ***.

## 2024-10-22 NOTE — DISCHARGE SUMMARY
Discharge Summary    Admission Date: 10/19/2024  Discharge Date: 10/22/2024    Discharge Diagnosis  Labor without complication ([facility]-HCC)    Hospital Course  Delivery Date: 10/19/2024 8:57 PM  Delivery type: , Low Transverse   GA at delivery: 38w3d  Outcome: Living  Anesthesia during delivery: Epidural  Intrapartum complications: Fetal Intolerance  Feeding method: Breastfeeding Status: No     Procedures: PLTCS      Last Vitals:  Temp Pulse Resp BP MAP Pulse Ox   37.6 °C (99.7 °F) 63 18 118/73 82 96 %     Discharge Meds     Your medication list        START taking these medications        Instructions Last Dose Given Next Dose Due   acetaminophen 325 mg tablet  Commonly known as: Tylenol      Take 3 tablets (975 mg) by mouth every 6 hours for 14 days.       FeroSuL tablet  Generic drug: ferrous sulfate (325 mg ferrous sulfate)      Take 1 tablet (325 mg) by mouth once daily with breakfast.       ibuprofen 600 mg tablet      Take 1 tablet (600 mg) by mouth every 6 hours for 14 days.       lidocaine 4 % patch      Place 1 patch over 12 hours on the skin every 24 (twenty four) hours if needed (Incisional pain). Remove & discard patch within 12 hours or as directed by MD.       Stool Softener-Laxative 8.6-50 mg tablet  Generic drug: sennosides-docusate sodium      Take 1 tablet by mouth once daily for 14 days.              CONTINUE taking these medications        Instructions Last Dose Given Next Dose Due   PRENATAL COMPLETE ORAL                     Where to Get Your Medications        These medications were sent to [facility]  [address], [place] [address] [address]      Hours: 8 AM to 6 PM Mon-Fri, 9 AM to 1 PM Saturday Phone: [phone]   acetaminophen 325 mg tablet  FeroSuL tablet  ibuprofen 600 mg tablet  lidocaine 4 % patch  Stool Softener-Laxative 8.6-50 mg tablet          Complications Requiring Follow-Up  None    Test Results Pending At Discharge  Pending Labs       Order Current  Status    Surgical Pathology Exam - [facility] In process            Outpatient Follow-Up  Future Appointments   Date Time Provider Department Center   11/8/2024 10:30 AM Celine Bernal MD [id number]           Russell Bartlett MD

## 2024-10-22 NOTE — CARE PLAN
The patient's goals for the shift include infant care and bonding, get some rest     The clinical goals for the shift include VSS, assessments WNL

## 2024-10-22 NOTE — PROGRESS NOTES
Postpartum Progress Note    Assessment/Plan   Olga Christopher is a 34 y.o., , who delivered at 38w3d gestation and is now POD#1 s/p PLTCS for NRFHTs  -Discharge home today  -Rx motrin/tylenol  -Routine postpartum instructions, remove dressing 1 week after delivery  -RTO in 2 weeks for incision check    Active Problems:    Antepartum non-reassuring fetal heart rate or rhythm affecting care of mother    Status post emergency  section    Pregnancy Problems (from 24 to present)       Problem Noted Diagnosed Resolved    Rh negative state in antepartum period ([facility]) 2024 by Celine Bernal MD  No    Priority:  Medium       Overview Addendum 2024  3:11 PM by Sherin Prather MD     She received Rhogam 2024 when she presented with light bleeding.  Next Rhogam is due 2024.          Vaginal discharge in pregnancy, second trimester ([facility]) 2024 by Celine Bernal MD  2024 by Celine Bernal MD    38 weeks gestation of pregnancy ([facility]) 3/29/2024 by Celine Bernal MD  10/20/2024 by Celine Bernal MD    Overview Addendum 10/15/2024  1:42 PM by Aracelis Carpio RN     Desired provider in labor: [] CNM  [] Physician  [x] Initial BMI: 23.49  [x] Prenatal Labs: 4/3/24  [x] Rh status: O NEG  [x] Genetic Screening:    [x] NT US: 24  [] Baby ASA (if indicated):  [x] Pregnancy dated by: LMP     [x] Anatomy US:   [] Patient added to BirthTracks  [x] 1hr GCT at 24-28wks: , result 118  [] 3 hr GTT (if indicated):  [x] Rhogam (if indicated): 24  [] Fetal Surveillance (if indicated):    [x] Tdap (27-36wks): 24  [x] RSV (32-36wks) ( Sept. To end of  ): 10/3/24  [x] Flu Shot: declined  [] COVID vaccine:      [x] Breastfeeding: education provided  [] Postpartum Birth control method:   [x] GBS at 36 - 37 wks: Negative   [x] 39 weeks discussion of IOL vs. Expectant management: IOL scheduled 10/25 @ [facility]   [x] Mode of delivery ( anticipated ): vaginal         Young  multigravida in third trimester ([facility]) 3/29/2024 by Celine Bernal MD  10/20/2024 by Celine Bernal MD              Subjective   Patient is doing well postpartum.  Pain is well controlled.  She is ambulating and voiding without difficulty.  VB is wnl.  +Flatus.  Bottle feeding.       Objective   Allergies:   Patient has no known allergies.         Last Vitals:  Temp Pulse Resp BP MAP Pulse Ox   37.6 °C (99.7 °F) 63 18 118/73   96 %     Vitals Min/Max Last 24 Hours:  Temp  Min: 36 °C (96.8 °F)  Max: 37.6 °C (99.7 °F)  Pulse  Min: 60  Max: 79  Resp  Min: 17  Max: 18  BP  Min: 118/73  Max: 148/94    Intake/Output:   No intake or output data in the 24 hours ending 10/22/24 0810    Physical Exam:  Gen:  Alert, well-nourished, NAD  ABD:  Fundus firm, below umbilicus, dressing c/d/I, appropriately tender  EXT:  Trace edema, no calf tenderness

## 2024-10-24 ENCOUNTER — APPOINTMENT (OUTPATIENT)
Dept: OBSTETRICS AND GYNECOLOGY | Facility: CLINIC | Age: 34
End: 2024-10-24
Payer: COMMERCIAL

## 2024-10-24 ENCOUNTER — TELEPHONE (OUTPATIENT)
Dept: OBSTETRICS AND GYNECOLOGY | Facility: CLINIC | Age: 34
End: 2024-10-24

## 2024-10-24 NOTE — TELEPHONE ENCOUNTER
Patient called asking for a note / signed regarding short term disability asking for it to be 8 wks as she had a . Thank you

## 2024-10-25 LAB
LABORATORY COMMENT REPORT: NORMAL
PATH REPORT.FINAL DX SPEC: NORMAL
PATH REPORT.GROSS SPEC: NORMAL
PATH REPORT.RELEVANT HX SPEC: NORMAL
PATH REPORT.TOTAL CANCER: NORMAL

## 2024-10-31 ENCOUNTER — APPOINTMENT (OUTPATIENT)
Dept: OBSTETRICS AND GYNECOLOGY | Facility: CLINIC | Age: 34
End: 2024-10-31
Payer: COMMERCIAL

## 2024-11-08 ENCOUNTER — APPOINTMENT (OUTPATIENT)
Dept: OBSTETRICS AND GYNECOLOGY | Facility: CLINIC | Age: 34
End: 2024-11-08
Payer: COMMERCIAL

## 2024-11-11 ENCOUNTER — APPOINTMENT (OUTPATIENT)
Dept: OBSTETRICS AND GYNECOLOGY | Facility: CLINIC | Age: 34
End: 2024-11-11
Payer: COMMERCIAL

## 2024-11-11 ENCOUNTER — HOSPITAL ENCOUNTER (OUTPATIENT)
Dept: RADIOLOGY | Facility: CLINIC | Age: 34
Discharge: HOME | End: 2024-11-11
Payer: COMMERCIAL

## 2024-11-11 ENCOUNTER — PREP FOR PROCEDURE (OUTPATIENT)
Dept: OBSTETRICS AND GYNECOLOGY | Facility: CLINIC | Age: 34
End: 2024-11-11

## 2024-11-11 VITALS — DIASTOLIC BLOOD PRESSURE: 78 MMHG | SYSTOLIC BLOOD PRESSURE: 110 MMHG | WEIGHT: 166 LBS | BODY MASS INDEX: 26.05 KG/M2

## 2024-11-11 DIAGNOSIS — R42 VERTIGO: ICD-10-CM

## 2024-11-11 DIAGNOSIS — Z30.2 ENCOUNTER FOR FEMALE STERILIZATION PROCEDURE: Primary | ICD-10-CM

## 2024-11-11 DIAGNOSIS — Z98.891 STATUS POST EMERGENCY CESAREAN SECTION: ICD-10-CM

## 2024-11-11 DIAGNOSIS — Z30.011 ENCOUNTER FOR INITIAL PRESCRIPTION OF CONTRACEPTIVE PILLS: Primary | ICD-10-CM

## 2024-11-11 PROCEDURE — 2550000001 HC RX 255 CONTRASTS: Performed by: PSYCHIATRY & NEUROLOGY

## 2024-11-11 PROCEDURE — 70498 CT ANGIOGRAPHY NECK: CPT | Performed by: STUDENT IN AN ORGANIZED HEALTH CARE EDUCATION/TRAINING PROGRAM

## 2024-11-11 PROCEDURE — 70496 CT ANGIOGRAPHY HEAD: CPT

## 2024-11-11 PROCEDURE — 70496 CT ANGIOGRAPHY HEAD: CPT | Performed by: STUDENT IN AN ORGANIZED HEALTH CARE EDUCATION/TRAINING PROGRAM

## 2024-11-11 PROCEDURE — 70498 CT ANGIOGRAPHY NECK: CPT

## 2024-11-11 RX ORDER — ACETAMINOPHEN 325 MG/1
975 TABLET ORAL ONCE
OUTPATIENT
Start: 2024-11-11 | End: 2024-11-11

## 2024-11-11 RX ORDER — NORETHINDRONE 0.35 MG/1
1 TABLET ORAL DAILY
Qty: 28 TABLET | Refills: 11 | Status: SHIPPED | OUTPATIENT
Start: 2024-11-11 | End: 2025-11-11

## 2024-11-11 RX ORDER — GABAPENTIN 600 MG/1
600 TABLET ORAL ONCE
OUTPATIENT
Start: 2024-11-11 | End: 2024-11-11

## 2024-11-11 RX ORDER — CELECOXIB 400 MG/1
400 CAPSULE ORAL ONCE
OUTPATIENT
Start: 2024-11-11 | End: 2024-11-11

## 2024-11-11 ASSESSMENT — EDINBURGH POSTNATAL DEPRESSION SCALE (EPDS)
I HAVE BEEN SO UNHAPPY THAT I HAVE BEEN CRYING: NO, NEVER
I HAVE FELT SAD OR MISERABLE: NO, NOT AT ALL
I HAVE FELT SCARED OR PANICKY FOR NO GOOD REASON: NO, NOT MUCH
TOTAL SCORE: 4
I HAVE LOOKED FORWARD WITH ENJOYMENT TO THINGS: AS MUCH AS I EVER DID
THINGS HAVE BEEN GETTING ON TOP OF ME: NO, I HAVE BEEN COPING AS WELL AS EVER
I HAVE BEEN ABLE TO LAUGH AND SEE THE FUNNY SIDE OF THINGS: AS MUCH AS I ALWAYS COULD
I HAVE BEEN ANXIOUS OR WORRIED FOR NO GOOD REASON: HARDLY EVER
I HAVE BEEN SO UNHAPPY THAT I HAVE HAD DIFFICULTY SLEEPING: NOT AT ALL
I HAVE BLAMED MYSELF UNNECESSARILY WHEN THINGS WENT WRONG: YES, SOME OF THE TIME
THE THOUGHT OF HARMING MYSELF HAS OCCURRED TO ME: NEVER

## 2024-11-11 NOTE — PROGRESS NOTES
34 y.o.  presenting for 2 week postpartum follow-up     Delivery Date: 10/19/24  GA at Delivery: 38 weeks  Type of Delivery: PLTCS    Pregnancy notable for:    Concerns: none    Pain: controlled  Lacerations: none  Lochia: minimal  Menses: No  Sexual Intimacy: No  Contraceptive Method: None  Feeding Method: Bottle  Lactation Consult Needed?: No    Birth Trauma: No  Bonding with Baby: well with Male [unfilled]   Mood:   good    OBGyn Exam   Incision: Clear/dry and intact  Problem List Items Addressed This Visit       Status post emergency  section    Encounter for postpartum visit     Other Visit Diagnoses       Encounter for initial prescription of contraceptive pills    -  Primary    Relevant Medications    norethindrone (Micronor) 0.35 mg tablet            IMPRESSIONS:  Thank you for coming to your postpartum visit. Everything seems to be recovering appropriately at this time. You are free to resume normal activity. Please don't hesitate to call us for breast complaints, abnormal bleeding, mood changes, or other concerning symptoms - we have many good treatment options for these issues.   Rx for POP done  Desires permanent sterilization surgical counseling for laparoscopic bilateral salpingectomy done.  Will schedule after 4 weeks, prep for procedure done.        We look forward to seeing you again at your next scheduled visit in 3 to 4  weeks    Celine Bernal MD

## 2024-11-13 PROBLEM — Z30.2 ENCOUNTER FOR FEMALE STERILIZATION PROCEDURE: Status: ACTIVE | Noted: 2024-11-11

## 2024-12-02 ENCOUNTER — TELEPHONE (OUTPATIENT)
Dept: OBSTETRICS AND GYNECOLOGY | Facility: CLINIC | Age: 34
End: 2024-12-02
Payer: COMMERCIAL

## 2024-12-02 NOTE — TELEPHONE ENCOUNTER
Returned patient call. Patient started noticing insicion painful to touch and becoming uncomfortable yesterday. Patient states she noticed a reddened area the size of a quarter+dime that may be an ingrown hair. Patient denies any drainage or other signs of infection. Advised patient that next available appointment with Dr. Bernal would be this Thursday. Patent agreeable to have appointment moved up sooner and will keep area free of undergarment bands and bottom waste band free from incision site.

## 2024-12-02 NOTE — TELEPHONE ENCOUNTER
Patient called and states that she thinks her C section incision might be infected. Would like a call back please to see if she needs to come in sooner. Please advise.

## 2024-12-03 PROBLEM — O36.8390 ANTEPARTUM NON-REASSURING FETAL HEART RATE OR RHYTHM AFFECTING CARE OF MOTHER: Status: RESOLVED | Noted: 2024-10-19 | Resolved: 2024-12-03

## 2024-12-03 PROBLEM — O26.899 RH NEGATIVE STATE IN ANTEPARTUM PERIOD (HHS-HCC): Status: RESOLVED | Noted: 2024-04-04 | Resolved: 2024-12-03

## 2024-12-03 PROBLEM — Z67.91 RH NEGATIVE STATE IN ANTEPARTUM PERIOD (HHS-HCC): Status: RESOLVED | Noted: 2024-04-04 | Resolved: 2024-12-03

## 2024-12-03 NOTE — PROGRESS NOTES
34 y.o.  presenting for 6 week postpartum follow-up     Delivery Date: 10/19/24  GA at Delivery: 38 weeks  Type of Delivery: PLTCS    Pregnancy notable for:    Concerns: none    Pain: controlled  Lacerations: none  Lochia: minimal  Menses: No  Sexual Intimacy: No  Contraceptive Method: None  Feeding Method: Bottle  Lactation Consult Needed?: No    Birth Trauma: No  Bonding with Baby: well with Male [unfilled]   Mood:   good    OBGyn Exam   Incision: Clear/dry and intact  Problem List Items Addressed This Visit       Encounter for postpartum visit - Primary         IMPRESSIONS:  Thank you for coming to your postpartum visit. Everything seems to be recovering appropriately at this time. You are free to resume normal activity. Please don't hesitate to call us for breast complaints, abnormal bleeding, mood changes, or other concerning symptoms - we have many good treatment options for these issues.   Rx for POP done  Desires permanent sterilization surgical counseling for laparoscopic bilateral salpingectomy done.  Will schedule after 4 weeks, prep for procedure done.        We look forward to seeing you again at your next scheduled visit in 3 to 4  weeks    Celine Bernal MD

## 2024-12-05 ENCOUNTER — APPOINTMENT (OUTPATIENT)
Dept: OBSTETRICS AND GYNECOLOGY | Facility: CLINIC | Age: 34
End: 2024-12-05
Payer: COMMERCIAL

## 2024-12-05 ENCOUNTER — OFFICE VISIT (OUTPATIENT)
Dept: URGENT CARE | Age: 34
End: 2024-12-05
Payer: COMMERCIAL

## 2024-12-05 VITALS
TEMPERATURE: 98.4 F | BODY MASS INDEX: 26.68 KG/M2 | WEIGHT: 170 LBS | RESPIRATION RATE: 16 BRPM | HEART RATE: 79 BPM | SYSTOLIC BLOOD PRESSURE: 140 MMHG | DIASTOLIC BLOOD PRESSURE: 87 MMHG | OXYGEN SATURATION: 98 %

## 2024-12-05 DIAGNOSIS — T81.41XA SUPERFICIAL INCISIONAL INFECTION OF SURGICAL SITE: Primary | ICD-10-CM

## 2024-12-05 RX ORDER — AMOXICILLIN 875 MG/1
875 TABLET, FILM COATED ORAL 2 TIMES DAILY
Qty: 14 TABLET | Refills: 0 | Status: SHIPPED | OUTPATIENT
Start: 2024-12-05 | End: 2024-12-12

## 2024-12-05 NOTE — PROGRESS NOTES
Subjective   Patient ID: Olga Christopher is a 34 y.o. female. They present today with a chief complaint of incision redness ( incision redness times 5 days.).    History of Present Illness  35 yo female coming in for possible infection to her  incision. She states the redness and drainage she has noted for the last 5 days. She states she has seen some bloody drainage but also yellow drainage.    Past Medical History  Allergies as of 2024    (No Known Allergies)       (Not in a hospital admission)       Past Medical History:   Diagnosis Date    Complete or unspecified spontaneous  without complication 2020    , spontaneous complete    Complete spontaneous  ([facility]) 2024    Dizziness     Encounter for  screening, unspecified 2021    Encounter for  screening    Encounter for pregnancy test, result positive ([facility]) 2021    Pregnancy test positive    Encounter for screening for infections with a predominantly sexual mode of transmission 02/15/2021    Screening for STD (sexually transmitted disease)    Encounter for supervision of normal pregnancy, unspecified, third trimester 2021    Third trimester pregnancy    Encounter for supervision of other normal pregnancy, first trimester 2021    Multigravida in first trimester    Encounter for supervision of other normal pregnancy, second trimester 06/10/2021    Multigravida in second trimester    Encounter for supervision of other normal pregnancy, third trimester 2021    Multigravida in third trimester    Maternal care for other known or suspected poor fetal growth, unspecified trimester, not applicable or unspecified 2021    SGA (small for gestational age), fetal, affecting care of mother, antepartum    Bim small for gestational age, unspecified weight ([facility]) 2021    SGA (small for gestational age)    Other specified abnormal findings of blood  chemistry 2020    Abnormal thyroid screen (blood)    Other specified pregnancy related conditions, unspecified trimester ([facility]) 2021    Pain of round ligament during pregnancy    Pelvic and perineal pain 02/15/2021    Pelvic pain in female    Personal history of other complications of pregnancy, childbirth and the puerperium 2021    H/O: 1 miscarriage    Personal history of other diseases of the female genital tract 02/15/2021    History of vaginal discharge    Personal history of other diseases of the musculoskeletal system and connective tissue     History of scoliosis    Pregnancy with inconclusive fetal viability, not applicable or unspecified 2021    Encounter to determine fetal viability of pregnancy       Past Surgical History:   Procedure Laterality Date    SPINE SURGERY  2017    Scoliosis        reports that she has never smoked. She has never used smokeless tobacco. She reports that she does not currently use alcohol. She reports that she does not currently use drugs.    Review of Systems  Review of Systems:  General: No weight loss, fatigue, anorexia, insomnia, fever, chills.  Cardiac: No chest pain, palpitations, syncope, near syncope.  Pulmonary:  No shortness of breath, cough, hemoptysis  Heme/lymph: No swollen glands, fever, bleeding  GI: No abdominal pain, change in bowel habits, melena, hematemesis, hematochezia, nausea, vomiting, or diarrhea  Skin: No rashes. Positive redness and drainage from  incision  Neuro: No numbness, tingling, headaches                                 Objective    Vitals:    24 1119   BP: 140/87   Pulse: 79   Resp: 16   Temp: 36.9 °C (98.4 °F)   SpO2: 98%   Weight: 77.1 kg (170 lb)     Patient's last menstrual period was 2024.    Physical Exam  Physical Exam:  General: Vital noted, no distress. Afebrile  Cardiac: Regular rate and rhythm, no murmur  Pulmonary: Lungs clear bilaterally with good aeration. No adventitious  breath sounds.  Abdomen: Soft, nontender, nonsurgical. No peritoneal signs. Normoactive bowel sounds.   Skin: No rashes.  incision is noted to have erythema in the middle of the incision and at the left end of the incision. Yellow drainage is noted. There is no warmth noted to the incision line.   Neuro: No focal neurologic deficits, NIH score of 0.      Procedures    Point of Care Test & Imaging Results from this visit  No results found for this visit on 24.   No results found.    Diagnostic study results (if any) were reviewed by MARVIN Burgess.    Assessment/Plan   Allergies, medications, history, and pertinent labs/EKGs/Imaging reviewed by MARVIN Burgess.     Medical Decision Making  Treatment: Amoxicillin prescribed  Differential: 1) surgical incision infection , 2) surgical incision dehiscence, 3) delayed healing of surgical incision   Plan: Patient will follow up with the PCP in the next 2-3 days. Return for any worsening symptoms or go to the ER for further evaluation. Patient understands return precautions and discharge insturctions.  Impression:   1) surgical incision infection      Orders and Diagnoses  Diagnoses and all orders for this visit:  Superficial incisional infection of surgical site  -     amoxicillin (Amoxil) 875 mg tablet; Take 1 tablet (875 mg) by mouth 2 times a day for 7 days.      Medical Admin Record      Patient disposition: Home    Electronically signed by MARVIN Burgess  12:08 PM

## 2024-12-09 ENCOUNTER — APPOINTMENT (OUTPATIENT)
Dept: OBSTETRICS AND GYNECOLOGY | Facility: CLINIC | Age: 34
End: 2024-12-09
Payer: COMMERCIAL

## 2024-12-09 ENCOUNTER — POSTPARTUM VISIT (OUTPATIENT)
Dept: OBSTETRICS AND GYNECOLOGY | Facility: CLINIC | Age: 34
End: 2024-12-09
Payer: COMMERCIAL

## 2024-12-09 VITALS — BODY MASS INDEX: 26.53 KG/M2 | WEIGHT: 169 LBS | DIASTOLIC BLOOD PRESSURE: 78 MMHG | SYSTOLIC BLOOD PRESSURE: 100 MMHG

## 2024-12-09 DIAGNOSIS — Z98.891 STATUS POST EMERGENCY CESAREAN SECTION: ICD-10-CM

## 2024-12-09 DIAGNOSIS — Z01.818 VISIT FOR PRE-OPERATIVE EXAMINATION: Primary | ICD-10-CM

## 2024-12-09 PROCEDURE — 0503F POSTPARTUM CARE VISIT: CPT | Performed by: OBSTETRICS & GYNECOLOGY

## 2024-12-09 ASSESSMENT — EDINBURGH POSTNATAL DEPRESSION SCALE (EPDS)
THE THOUGHT OF HARMING MYSELF HAS OCCURRED TO ME: NEVER
I HAVE FELT SCARED OR PANICKY FOR NO GOOD REASON: NO, NOT MUCH
I HAVE BLAMED MYSELF UNNECESSARILY WHEN THINGS WENT WRONG: YES, SOME OF THE TIME
I HAVE FELT SAD OR MISERABLE: NO, NOT AT ALL
I HAVE LOOKED FORWARD WITH ENJOYMENT TO THINGS: AS MUCH AS I EVER DID
THINGS HAVE BEEN GETTING ON TOP OF ME: NO, MOST OF THE TIME I HAVE COPED QUITE WELL
TOTAL SCORE: 6
I HAVE BEEN ANXIOUS OR WORRIED FOR NO GOOD REASON: YES, SOMETIMES
I HAVE BEEN SO UNHAPPY THAT I HAVE BEEN CRYING: NO, NEVER
I HAVE BEEN ABLE TO LAUGH AND SEE THE FUNNY SIDE OF THINGS: AS MUCH AS I ALWAYS COULD
I HAVE BEEN SO UNHAPPY THAT I HAVE HAD DIFFICULTY SLEEPING: NOT AT ALL

## 2024-12-09 NOTE — PROGRESS NOTES
34 y.o.  presenting for 6 week postpartum follow-up and preoperative visit.  She is scheduled for Laparoscopic BS on     Delivery Date: 10/19/24  GA at Delivery: 38 weeks  Type of Delivery: PLTCS  LMP: 24  Pregnancy notable for: ELTCS    Concerns: none    Pain: controlled  Lacerations: none  Lochia: minimal  Menses: No  Sexual Intimacy: No  Contraceptive Method: None  Feeding Method: Bottle  Lactation Consult Needed?: No    Birth Trauma: No  Bonding with Baby: well with Male [unfilled]   Mood:   good    Physical Exam  Constitutional:       Appearance: Normal appearance.   Genitourinary:      Vulva normal.   HENT:      Head: Normocephalic and atraumatic.   Eyes:      Extraocular Movements: Extraocular movements intact.      Pupils: Pupils are equal, round, and reactive to light.   Cardiovascular:      Rate and Rhythm: Normal rate and regular rhythm.   Pulmonary:      Effort: Pulmonary effort is normal.   Abdominal:      General: Abdomen is flat.      Palpations: Abdomen is soft.   Musculoskeletal:         General: Normal range of motion.      Cervical back: Normal range of motion and neck supple.   Neurological:      General: No focal deficit present.      Mental Status: She is alert and oriented to person, place, and time.   Skin:     General: Skin is warm.          Incision: Clear/dry and intact  Problem List Items Addressed This Visit       Status post emergency  section    Encounter for postpartum visit    Visit for pre-operative examination - Primary           IMPRESSIONS:  Surgical counseling for laparoscopic bilateral salpingectomy done.  Preoperative instructions provided, and all questions answered.  Surgical consent done.  More than 50% of time was utilized for counseling        We look forward to seeing you again at your next scheduled visit in 3 to 4  weeks    Celine Bernal MD

## 2024-12-16 ENCOUNTER — ANESTHESIA EVENT (OUTPATIENT)
Dept: OPERATING ROOM | Facility: HOSPITAL | Age: 34
End: 2024-12-16
Payer: COMMERCIAL

## 2024-12-18 ENCOUNTER — CLINICAL SUPPORT (OUTPATIENT)
Dept: PREADMISSION TESTING | Facility: HOSPITAL | Age: 34
End: 2024-12-18
Payer: COMMERCIAL

## 2024-12-18 ASSESSMENT — DUKE ACTIVITY SCORE INDEX (DASI)
CAN YOU PARTICIPATE IN MODERATE RECREATIONAL ACTIVITIES LIKE GOLF, BOWLING, DANCING, DOUBLES TENNIS OR THROWING A BASEBALL OR FOOTBALL: NO
CAN YOU DO LIGHT WORK AROUND THE HOUSE LIKE DUSTING OR WASHING DISHES: YES
CAN YOU DO YARD WORK LIKE RAKING LEAVES, WEEDING OR PUSHING A MOWER: YES
CAN YOU DO HEAVY WORK AROUND THE HOUSE LIKE SCRUBBING FLOORS OR LIFTING AND MOVING HEAVY FURNITURE: YES
CAN YOU CLIMB A FLIGHT OF STAIRS OR WALK UP A HILL: YES
CAN YOU WALK INDOORS, SUCH AS AROUND YOUR HOUSE: YES
CAN YOU TAKE CARE OF YOURSELF (EAT, DRESS, BATHE, OR USE TOILET): YES
CAN YOU PARTICIPATE IN STRENOUS SPORTS LIKE SWIMMING, SINGLES TENNIS, FOOTBALL, BASKETBALL, OR SKIING: YES
CAN YOU RUN A SHORT DISTANCE: YES
CAN YOU WALK A BLOCK OR TWO ON LEVEL GROUND: YES
CAN YOU DO MODERATE WORK AROUND THE HOUSE LIKE VACUUMING, SWEEPING FLOORS OR CARRYING GROCERIES: YES

## 2024-12-24 ENCOUNTER — PHARMACY VISIT (OUTPATIENT)
Dept: PHARMACY | Facility: CLINIC | Age: 34
End: 2024-12-24
Payer: MEDICARE

## 2024-12-24 ENCOUNTER — ANESTHESIA (OUTPATIENT)
Dept: OPERATING ROOM | Facility: HOSPITAL | Age: 34
End: 2024-12-24
Payer: COMMERCIAL

## 2024-12-24 ENCOUNTER — HOSPITAL ENCOUNTER (OUTPATIENT)
Facility: HOSPITAL | Age: 34
Setting detail: OUTPATIENT SURGERY
Discharge: HOME | End: 2024-12-24
Attending: OBSTETRICS & GYNECOLOGY | Admitting: OBSTETRICS & GYNECOLOGY
Payer: COMMERCIAL

## 2024-12-24 ENCOUNTER — APPOINTMENT (OUTPATIENT)
Dept: CARDIOLOGY | Facility: HOSPITAL | Age: 34
End: 2024-12-24
Payer: COMMERCIAL

## 2024-12-24 VITALS
SYSTOLIC BLOOD PRESSURE: 124 MMHG | BODY MASS INDEX: 26.68 KG/M2 | WEIGHT: 166 LBS | OXYGEN SATURATION: 94 % | DIASTOLIC BLOOD PRESSURE: 83 MMHG | HEART RATE: 88 BPM | TEMPERATURE: 98 F | RESPIRATION RATE: 16 BRPM | HEIGHT: 66 IN

## 2024-12-24 DIAGNOSIS — Z98.890 STATUS POST LAPAROSCOPY: Primary | ICD-10-CM

## 2024-12-24 DIAGNOSIS — Z30.2 ENCOUNTER FOR FEMALE STERILIZATION PROCEDURE: ICD-10-CM

## 2024-12-24 LAB
ALBUMIN SERPL BCP-MCNC: 4 G/DL (ref 3.4–5)
ALP SERPL-CCNC: 60 U/L (ref 33–110)
ALT SERPL W P-5'-P-CCNC: 24 U/L (ref 7–45)
ANION GAP SERPL CALC-SCNC: 13 MMOL/L (ref 10–20)
AST SERPL W P-5'-P-CCNC: 17 U/L (ref 9–39)
ATRIAL RATE: 90 BPM
BILIRUB SERPL-MCNC: 0.4 MG/DL (ref 0–1.2)
BUN SERPL-MCNC: 15 MG/DL (ref 6–23)
CALCIUM SERPL-MCNC: 8.9 MG/DL (ref 8.6–10.3)
CHLORIDE SERPL-SCNC: 106 MMOL/L (ref 98–107)
CO2 SERPL-SCNC: 23 MMOL/L (ref 21–32)
CREAT SERPL-MCNC: 0.47 MG/DL (ref 0.5–1.05)
EGFRCR SERPLBLD CKD-EPI 2021: >90 ML/MIN/1.73M*2
ERYTHROCYTE [DISTWIDTH] IN BLOOD BY AUTOMATED COUNT: 12.9 % (ref 11.5–14.5)
GLUCOSE SERPL-MCNC: 95 MG/DL (ref 74–99)
HCT VFR BLD AUTO: 38.6 % (ref 36–46)
HGB BLD-MCNC: 12.6 G/DL (ref 12–16)
MCH RBC QN AUTO: 26.5 PG (ref 26–34)
MCHC RBC AUTO-ENTMCNC: 32.6 G/DL (ref 32–36)
MCV RBC AUTO: 81 FL (ref 80–100)
NRBC BLD-RTO: 0 /100 WBCS (ref 0–0)
P AXIS: 20 DEGREES
PLATELET # BLD AUTO: 205 X10*3/UL (ref 150–450)
POTASSIUM SERPL-SCNC: 4 MMOL/L (ref 3.5–5.3)
PR INTERVAL: 141 MS
PREGNANCY TEST URINE, POC: NEGATIVE
PROT SERPL-MCNC: 6.5 G/DL (ref 6.4–8.2)
Q ONSET: 252 MS
QRS COUNT: 14 BEATS
QRS DURATION: 80 MS
QT INTERVAL: 361 MS
QTC CALCULATION(BAZETT): 440 MS
QTC FREDERICIA: 411 MS
R AXIS: 11 DEGREES
RBC # BLD AUTO: 4.76 X10*6/UL (ref 4–5.2)
SODIUM SERPL-SCNC: 138 MMOL/L (ref 136–145)
T AXIS: 26 DEGREES
T OFFSET: 433 MS
VENTRICULAR RATE: 89 BPM
WBC # BLD AUTO: 5 X10*3/UL (ref 4.4–11.3)

## 2024-12-24 PROCEDURE — 7100000009 HC PHASE TWO TIME - INITIAL BASE CHARGE: Performed by: OBSTETRICS & GYNECOLOGY

## 2024-12-24 PROCEDURE — 3600000008 HC OR TIME - EACH INCREMENTAL 1 MINUTE - PROCEDURE LEVEL THREE: Performed by: OBSTETRICS & GYNECOLOGY

## 2024-12-24 PROCEDURE — 2500000004 HC RX 250 GENERAL PHARMACY W/ HCPCS (ALT 636 FOR OP/ED): Performed by: ANESTHESIOLOGY

## 2024-12-24 PROCEDURE — 81025 URINE PREGNANCY TEST: CPT | Performed by: OBSTETRICS & GYNECOLOGY

## 2024-12-24 PROCEDURE — 7100000002 HC RECOVERY ROOM TIME - EACH INCREMENTAL 1 MINUTE: Performed by: OBSTETRICS & GYNECOLOGY

## 2024-12-24 PROCEDURE — 3700000001 HC GENERAL ANESTHESIA TIME - INITIAL BASE CHARGE: Performed by: OBSTETRICS & GYNECOLOGY

## 2024-12-24 PROCEDURE — 3600000003 HC OR TIME - INITIAL BASE CHARGE - PROCEDURE LEVEL THREE: Performed by: OBSTETRICS & GYNECOLOGY

## 2024-12-24 PROCEDURE — 80053 COMPREHEN METABOLIC PANEL: CPT | Performed by: ANESTHESIOLOGY

## 2024-12-24 PROCEDURE — 7100000010 HC PHASE TWO TIME - EACH INCREMENTAL 1 MINUTE: Performed by: OBSTETRICS & GYNECOLOGY

## 2024-12-24 PROCEDURE — 2500000005 HC RX 250 GENERAL PHARMACY W/O HCPCS: Performed by: NURSE ANESTHETIST, CERTIFIED REGISTERED

## 2024-12-24 PROCEDURE — 2500000001 HC RX 250 WO HCPCS SELF ADMINISTERED DRUGS (ALT 637 FOR MEDICARE OP): Performed by: OBSTETRICS & GYNECOLOGY

## 2024-12-24 PROCEDURE — 7100000001 HC RECOVERY ROOM TIME - INITIAL BASE CHARGE: Performed by: OBSTETRICS & GYNECOLOGY

## 2024-12-24 PROCEDURE — 36415 COLL VENOUS BLD VENIPUNCTURE: CPT | Performed by: ANESTHESIOLOGY

## 2024-12-24 PROCEDURE — 85027 COMPLETE CBC AUTOMATED: CPT | Performed by: ANESTHESIOLOGY

## 2024-12-24 PROCEDURE — 3700000002 HC GENERAL ANESTHESIA TIME - EACH INCREMENTAL 1 MINUTE: Performed by: OBSTETRICS & GYNECOLOGY

## 2024-12-24 PROCEDURE — 58661 LAPAROSCOPY REMOVE ADNEXA: CPT | Performed by: OBSTETRICS & GYNECOLOGY

## 2024-12-24 PROCEDURE — 93005 ELECTROCARDIOGRAM TRACING: CPT | Mod: 59

## 2024-12-24 PROCEDURE — 2500000001 HC RX 250 WO HCPCS SELF ADMINISTERED DRUGS (ALT 637 FOR MEDICARE OP): Performed by: ANESTHESIOLOGY

## 2024-12-24 PROCEDURE — 2720000007 HC OR 272 NO HCPCS: Performed by: OBSTETRICS & GYNECOLOGY

## 2024-12-24 PROCEDURE — 2500000004 HC RX 250 GENERAL PHARMACY W/ HCPCS (ALT 636 FOR OP/ED): Performed by: NURSE ANESTHETIST, CERTIFIED REGISTERED

## 2024-12-24 PROCEDURE — 93010 ELECTROCARDIOGRAM REPORT: CPT | Performed by: INTERNAL MEDICINE

## 2024-12-24 PROCEDURE — 96372 THER/PROPH/DIAG INJ SC/IM: CPT | Performed by: NURSE ANESTHETIST, CERTIFIED REGISTERED

## 2024-12-24 PROCEDURE — RXMED WILLOW AMBULATORY MEDICATION CHARGE

## 2024-12-24 RX ORDER — FAMOTIDINE 10 MG/ML
20 INJECTION INTRAVENOUS ONCE
Status: COMPLETED | OUTPATIENT
Start: 2024-12-24 | End: 2024-12-24

## 2024-12-24 RX ORDER — LIDOCAINE HCL/PF 100 MG/5ML
SYRINGE (ML) INTRAVENOUS AS NEEDED
Status: DISCONTINUED | OUTPATIENT
Start: 2024-12-24 | End: 2024-12-24

## 2024-12-24 RX ORDER — IBUPROFEN 600 MG/1
600 TABLET ORAL EVERY 6 HOURS PRN
Qty: 60 TABLET | Refills: 0 | Status: SHIPPED | OUTPATIENT
Start: 2024-12-24 | End: 2025-01-08

## 2024-12-24 RX ORDER — ONDANSETRON HYDROCHLORIDE 2 MG/ML
4 INJECTION, SOLUTION INTRAVENOUS ONCE
Status: COMPLETED | OUTPATIENT
Start: 2024-12-24 | End: 2024-12-24

## 2024-12-24 RX ORDER — FENTANYL CITRATE 50 UG/ML
INJECTION, SOLUTION INTRAMUSCULAR; INTRAVENOUS AS NEEDED
Status: DISCONTINUED | OUTPATIENT
Start: 2024-12-24 | End: 2024-12-24

## 2024-12-24 RX ORDER — METOCLOPRAMIDE HYDROCHLORIDE 5 MG/ML
10 INJECTION INTRAMUSCULAR; INTRAVENOUS ONCE
Status: COMPLETED | OUTPATIENT
Start: 2024-12-24 | End: 2024-12-24

## 2024-12-24 RX ORDER — PROPOFOL 10 MG/ML
INJECTION, EMULSION INTRAVENOUS AS NEEDED
Status: DISCONTINUED | OUTPATIENT
Start: 2024-12-24 | End: 2024-12-24

## 2024-12-24 RX ORDER — KETOROLAC TROMETHAMINE 30 MG/ML
INJECTION, SOLUTION INTRAMUSCULAR; INTRAVENOUS AS NEEDED
Status: DISCONTINUED | OUTPATIENT
Start: 2024-12-24 | End: 2024-12-24

## 2024-12-24 RX ORDER — MIDAZOLAM HYDROCHLORIDE 1 MG/ML
INJECTION, SOLUTION INTRAMUSCULAR; INTRAVENOUS AS NEEDED
Status: DISCONTINUED | OUTPATIENT
Start: 2024-12-24 | End: 2024-12-24

## 2024-12-24 RX ORDER — ACETAMINOPHEN 325 MG/1
1000 TABLET ORAL EVERY 6 HOURS PRN
Qty: 80 TABLET | Refills: 0 | Status: SHIPPED | OUTPATIENT
Start: 2024-12-24 | End: 2025-01-03

## 2024-12-24 RX ORDER — OXYCODONE HYDROCHLORIDE 5 MG/1
5 TABLET ORAL EVERY 6 HOURS PRN
Qty: 12 TABLET | Refills: 0 | Status: SHIPPED | OUTPATIENT
Start: 2024-12-24 | End: 2024-12-31

## 2024-12-24 RX ORDER — ONDANSETRON HYDROCHLORIDE 2 MG/ML
4 INJECTION, SOLUTION INTRAVENOUS ONCE AS NEEDED
Status: DISCONTINUED | OUTPATIENT
Start: 2024-12-24 | End: 2024-12-24 | Stop reason: HOSPADM

## 2024-12-24 RX ORDER — GABAPENTIN 300 MG/1
600 CAPSULE ORAL ONCE
Status: COMPLETED | OUTPATIENT
Start: 2024-12-24 | End: 2024-12-24

## 2024-12-24 RX ORDER — ROCURONIUM BROMIDE 50 MG/5 ML
SYRINGE (ML) INTRAVENOUS AS NEEDED
Status: DISCONTINUED | OUTPATIENT
Start: 2024-12-24 | End: 2024-12-24

## 2024-12-24 RX ORDER — SODIUM CITRATE AND CITRIC ACID MONOHYDRATE 334; 500 MG/5ML; MG/5ML
30 SOLUTION ORAL ONCE
Status: COMPLETED | OUTPATIENT
Start: 2024-12-24 | End: 2024-12-24

## 2024-12-24 RX ORDER — DEXMEDETOMIDINE HYDROCHLORIDE 100 UG/ML
INJECTION, SOLUTION INTRAVENOUS AS NEEDED
Status: DISCONTINUED | OUTPATIENT
Start: 2024-12-24 | End: 2024-12-24

## 2024-12-24 RX ORDER — HYDROMORPHONE HYDROCHLORIDE 0.2 MG/ML
0.2 INJECTION INTRAMUSCULAR; INTRAVENOUS; SUBCUTANEOUS EVERY 5 MIN PRN
Status: DISCONTINUED | OUTPATIENT
Start: 2024-12-24 | End: 2024-12-24 | Stop reason: HOSPADM

## 2024-12-24 RX ORDER — SODIUM CHLORIDE, SODIUM LACTATE, POTASSIUM CHLORIDE, CALCIUM CHLORIDE 600; 310; 30; 20 MG/100ML; MG/100ML; MG/100ML; MG/100ML
20 INJECTION, SOLUTION INTRAVENOUS CONTINUOUS
Status: DISCONTINUED | OUTPATIENT
Start: 2024-12-24 | End: 2024-12-24 | Stop reason: HOSPADM

## 2024-12-24 RX ORDER — ALBUTEROL SULFATE 0.83 MG/ML
2.5 SOLUTION RESPIRATORY (INHALATION) ONCE
Status: DISCONTINUED | OUTPATIENT
Start: 2024-12-24 | End: 2024-12-24 | Stop reason: HOSPADM

## 2024-12-24 RX ORDER — ACETAMINOPHEN 325 MG/1
975 TABLET ORAL ONCE
Status: COMPLETED | OUTPATIENT
Start: 2024-12-24 | End: 2024-12-24

## 2024-12-24 RX ORDER — CELECOXIB 200 MG/1
400 CAPSULE ORAL ONCE
Status: COMPLETED | OUTPATIENT
Start: 2024-12-24 | End: 2024-12-24

## 2024-12-24 RX ADMIN — METOCLOPRAMIDE 10 MG: 5 INJECTION, SOLUTION INTRAMUSCULAR; INTRAVENOUS at 06:31

## 2024-12-24 RX ADMIN — FAMOTIDINE 20 MG: 10 INJECTION, SOLUTION INTRAVENOUS at 06:31

## 2024-12-24 RX ADMIN — CELECOXIB 400 MG: 200 CAPSULE ORAL at 06:31

## 2024-12-24 RX ADMIN — ACETAMINOPHEN 975 MG: 325 TABLET ORAL at 06:31

## 2024-12-24 RX ADMIN — DEXAMETHASONE SODIUM PHOSPHATE 8 MG: 4 INJECTION, SOLUTION INTRAMUSCULAR; INTRAVENOUS at 06:31

## 2024-12-24 RX ADMIN — SODIUM CITRATE AND CITRIC ACID MONOHYDRATE 30 ML: 500; 334 SOLUTION ORAL at 06:31

## 2024-12-24 RX ADMIN — GABAPENTIN 600 MG: 300 CAPSULE ORAL at 06:31

## 2024-12-24 RX ADMIN — ONDANSETRON 4 MG: 2 INJECTION INTRAMUSCULAR; INTRAVENOUS at 06:31

## 2024-12-24 SDOH — HEALTH STABILITY: MENTAL HEALTH: CURRENT SMOKER: 0

## 2024-12-24 ASSESSMENT — PAIN SCALES - GENERAL
PAINLEVEL_OUTOF10: 0 - NO PAIN
PAINLEVEL_OUTOF10: 0 - NO PAIN
PAINLEVEL_OUTOF10: 2
PAIN_LEVEL: 2
PAINLEVEL_OUTOF10: 0 - NO PAIN
PAINLEVEL_OUTOF10: 2
PAINLEVEL_OUTOF10: 0 - NO PAIN
PAINLEVEL_OUTOF10: 0 - NO PAIN

## 2024-12-24 ASSESSMENT — PAIN - FUNCTIONAL ASSESSMENT
PAIN_FUNCTIONAL_ASSESSMENT: 0-10

## 2024-12-24 NOTE — ANESTHESIA PROCEDURE NOTES
Airway  Date/Time: 12/24/2024 8:18 AM  Urgency: elective    Airway not difficult    Staffing  Performed: CRNA   Authorized by: MICHELLE Albarran    Performed by: MICHELLE Albarran  Patient location during procedure: OR    Indications and Patient Condition  Indications for airway management: anesthesia  Spontaneous Ventilation: absent  Sedation level: deep  Preoxygenated: yes  Patient position: sniffing  Mask difficulty assessment: 1 - vent by mask  Planned trial extubation    Final Airway Details  Final airway type: endotracheal airway      Successful airway: ETT  Cuffed: yes   Successful intubation technique: video laryngoscopy (Pack)  Facilitating devices/methods: intubating stylet  Endotracheal tube insertion site: oral  Blade: Maria G  Blade size: #3  ETT size (mm): 7.0  Cormack-Lehane Classification: grade I - full view of glottis  Placement verified by: chest auscultation and capnometry   Cuff volume (mL): 9  Measured from: lips  ETT to lips (cm): 22  Number of attempts at approach: 1    Additional Comments  Atraumatic intubation.

## 2024-12-24 NOTE — ANESTHESIA PREPROCEDURE EVALUATION
Patient: Olga Christopher    Procedure Information       Date/Time: 12/24/24 0715    Procedure: Laparoscopic bilateral salpingectomy (Bilateral: Pelvis) - Laparoscopic bilateral salpingectomy    Location: [facility] OR 02 / Virtual [facility] OR    Surgeons: Celine Bernal MD            Relevant Problems   Anesthesia (within normal limits)      Musculoskeletal   (+) Idiopathic scoliosis and kyphoscoliosis   (+) Idiopathic scoliosis of thoracolumbar spine       Clinical information reviewed:   Tobacco  Allergies  Meds  Problems  Med Hx  Surg Hx  OB Status    Fam Hx  Soc Hx        NPO Detail:  NPO/Void Status  Carbohydrate Drink Given Prior to Surgery? : N  Date of Last Liquid: 12/24/24  Time of Last Liquid: 0500  Date of Last Solid: 12/23/24  Time of Last Solid: 2200  Last Intake Type: Clear fluids  Time of Last Void: 0622         Physical Exam    Airway  Mallampati: I  TM distance: >3 FB  Neck ROM: full     Cardiovascular - normal exam     Dental - normal exam     Pulmonary - normal exam     Abdominal - normal exam             Anesthesia Plan    History of general anesthesia?: yes  History of complications of general anesthesia?: no    ASA 1     general     The patient is not a current smoker.    intravenous induction   Anesthetic plan and risks discussed with patient.    Plan discussed with CRNA.

## 2024-12-24 NOTE — OP NOTE
Laparoscopic bilateral salpingectomy (B) Operative Note     Date: 2024  OR Location: [facility]    Name: Olga Christopher, : 1990, Age: 34 y.o., MRN: [id number], Sex: female    Diagnosis  Pre-op Diagnosis      * Encounter for female sterilization procedure [Z30.2] Post-op Diagnosis     * Encounter for female sterilization procedure [Z30.2]     Procedures  Laparoscopic bilateral salpingectomy  70380 - VT LAPAROSCOPY W/RMVL ADNEXAL STRUCTURES      Surgeons      * Celine Bernal - Primary    Resident/Fellow/Other Assistant:  Surgeons and Role:  * No surgeons found with a matching role *    Staff:   Circulator: Laurie  Scrub Person: Charline  Surgical Assistant: Opal    Anesthesia Staff: CRNA: SHERLYN Albarran-CRNA    Procedure Summary  Anesthesia: General  ASA: I  Estimated Blood Loss: 2 mL  Intra-op Medications:   Administrations occurring from 0715 to 0845 on 24:   Medication Name Total Dose   dexmedeTOMIDine (Precedex) 100 mcg/mL 2 mL single dose vial 16 mcg   fentaNYL (Sublimaze) injection 50 mcg/mL 100 mcg   lidocaine (cardiac) injection 2% prefilled syringe 80 mg   midazolam (Versed) injection 1 mg/mL 2 mg   propofol (Diprivan) injection 10 mg/mL 200 mg   rocuronium (Zemuron) 50 mg/5 mL prefilled syringe 40 mg   NaCl 0.9 % bolus Cannot be calculated              Anesthesia Record               Intraprocedure I/O Totals          Output    Urine 400 mL    Est. Blood Loss 2 mL    Total Output 402 mL          Specimen:   ID Type Source Tests Collected by Time   1 : BILATERAL FALLOPIAN TUBES Tissue FALLOPIAN TUBE SALPINGECTOMY LEFT AND RIGHT SURGICAL PATHOLOGY EXAM Celine Bernal MD 2024 [id number]                 Drains and/or Catheters: * None in log *    Tourniquet Times:         Implants:     Findings: Normal appearing uterus, bilateral tubes and bilateral ovaries    Indications: Olga Christopher is an 34 y.o. female who is having surgery for Encounter for female sterilization procedure [Z30.2].      The patient was seen in the preoperative area. The risks, benefits, complications, treatment options, non-operative alternatives, expected recovery and outcomes were discussed with the patient. The possibilities of reaction to medication, pulmonary aspiration, injury to surrounding structures, bleeding, recurrent infection, the need for additional procedures, failure to diagnose a condition, and creating a complication requiring transfusion or operation were discussed with the patient. The patient concurred with the proposed plan, giving informed consent.  The site of surgery was properly noted/marked if necessary per policy. The patient has been actively warmed in preoperative area. Preoperative antibiotics are not indicated. Venous thrombosis prophylaxis have been ordered including bilateral sequential compression devices    Procedure Details: Patient was brought to the operating where safety huddle was completed. General anesthesia was obtained. Patient was placed in lithotomy position and prepped and draped.  Timeout procedure was completed Fleming catheter was placed in the bladder. Weighted speculum was placed in the vagina. The anterior lip of the cervix was grasped with tenaculum. The uterus was sounded then the [employer] uterine manipulator was inserted. Other instruments were removed. Gloves were changed.  Attention was turned to the patient's abdomen. A small infraumbilical incision was made with the scalpel. The Veress needle was inserted and intra-abdominal placement confirmed with a hanging drop test. Insufflation was performed. The needle was withdrawn and a 5 mm trocar inserted under direct visualization. In the patient's left lower quadrant a 8 mm incision was made and 8 mm trocar inserted under direct visualization. In the patient's right lower quadrant a 5 mm trocar was inserted under direct visualization through a stab incision.   Survey of the abdomen and pelvis revealed a normal appearing uterus  and tubes and ovaries. The left mesosalpinx was transected and the tube amputated and withdrawn from the abdomen. This procedure was repeated on the right with the mesosalpinx transected with LigaSure device and tube amputated and removed. All pedicles were noted to be hemostatic. Insufflation was released. Trocars were removed. All skin incisions were closed with 4-0 Vicryl. Vaginal instruments and Fleming catheter were removed. Patient was taken awake and in stable condition to recovery.   Complications:  None; patient tolerated the procedure well.    Disposition: PACU - hemodynamically stable.  Condition: stable         Task Performed by RNFA or Surgical Assistant:  First asssist          Additional Details:     Attending Attestation: I performed the procedure.    Celine Bernal  Phone Number: [phone]

## 2024-12-24 NOTE — ANESTHESIA POSTPROCEDURE EVALUATION
Patient: Olga Christopher    Procedure Summary       Date: 12/24/24 Room / Location: [facility] 02 / [facility]    Anesthesia Start: 0807 Anesthesia Stop: 0916    Procedure: Laparoscopic bilateral salpingectomy (Bilateral: Pelvis) Diagnosis:       Encounter for female sterilization procedure      (Encounter for female sterilization procedure [Z30.2])    Surgeons: Celine Bernal MD Responsible Provider: MICHELLE Albarran    Anesthesia Type: general ASA Status: 1            Anesthesia Type: general    Vitals Value Taken Time   /44 12/24/24 0940   Temp 36.7 °C (98.1 °F) 12/24/24 0940   Pulse 78 12/24/24 0940   Resp 16 12/24/24 0940   SpO2 98 % 12/24/24 0940       Anesthesia Post Evaluation    Patient location during evaluation: PACU  Patient participation: complete - patient participated  Level of consciousness: awake and alert  Pain score: 2  Pain management: satisfactory to patient  Airway patency: patent  Cardiovascular status: hemodynamically stable  Respiratory status: room air  Hydration status: acceptable  Postoperative Nausea and Vomiting: none    There were no known notable events for this encounter.

## 2024-12-26 ASSESSMENT — PAIN SCALES - GENERAL: PAINLEVEL_OUTOF10: 2

## 2025-01-06 ENCOUNTER — APPOINTMENT (OUTPATIENT)
Dept: OBSTETRICS AND GYNECOLOGY | Facility: CLINIC | Age: 35
End: 2025-01-06
Payer: COMMERCIAL

## 2025-01-09 PROBLEM — Z48.89 ENCOUNTER FOR POSTOPERATIVE WOUND CHECK: Status: ACTIVE | Noted: 2025-01-09

## 2025-01-10 ENCOUNTER — OFFICE VISIT (OUTPATIENT)
Dept: OBSTETRICS AND GYNECOLOGY | Facility: CLINIC | Age: 35
End: 2025-01-10
Payer: COMMERCIAL

## 2025-01-10 ENCOUNTER — APPOINTMENT (OUTPATIENT)
Dept: OBSTETRICS AND GYNECOLOGY | Facility: CLINIC | Age: 35
End: 2025-01-10
Payer: COMMERCIAL

## 2025-01-10 VITALS — DIASTOLIC BLOOD PRESSURE: 80 MMHG | SYSTOLIC BLOOD PRESSURE: 110 MMHG | WEIGHT: 170 LBS | BODY MASS INDEX: 27.44 KG/M2

## 2025-01-10 DIAGNOSIS — Z48.89 ENCOUNTER FOR POSTOPERATIVE WOUND CHECK: Primary | ICD-10-CM

## 2025-01-10 PROCEDURE — 1036F TOBACCO NON-USER: CPT | Performed by: OBSTETRICS & GYNECOLOGY

## 2025-01-10 PROCEDURE — 99024 POSTOP FOLLOW-UP VISIT: CPT | Performed by: OBSTETRICS & GYNECOLOGY

## 2025-01-10 NOTE — PROGRESS NOTES
\HPI    Postop follow-up status post laparoscopic bilateral salpingectomy    OBGyn Exam  Incisions x 3: healing well    Review of Systems  All other systems negative        Assessment/Plan   Problem List Items Addressed This Visit             ICD-10-CM    Encounter for postoperative wound check - Primary Z48.89   Doing well   Benign surgical pathology reviewed

## 2025-02-22 ENCOUNTER — OFFICE VISIT (OUTPATIENT)
Dept: URGENT CARE | Age: 35
End: 2025-02-22
Payer: COMMERCIAL

## 2025-02-22 VITALS
TEMPERATURE: 98.1 F | OXYGEN SATURATION: 100 % | DIASTOLIC BLOOD PRESSURE: 82 MMHG | SYSTOLIC BLOOD PRESSURE: 121 MMHG | HEART RATE: 70 BPM

## 2025-02-22 DIAGNOSIS — J02.9 PHARYNGITIS, UNSPECIFIED ETIOLOGY: ICD-10-CM

## 2025-02-22 DIAGNOSIS — J02.9 SORE THROAT: Primary | ICD-10-CM

## 2025-02-22 LAB — POC RAPID STREP: NEGATIVE

## 2025-02-22 RX ORDER — AMOXICILLIN 875 MG/1
875 TABLET, FILM COATED ORAL 2 TIMES DAILY
Qty: 20 TABLET | Refills: 0 | Status: SHIPPED | OUTPATIENT
Start: 2025-02-22 | End: 2025-03-04

## 2025-02-22 RX ORDER — METHYLPREDNISOLONE 4 MG/1
TABLET ORAL
Qty: 21 TABLET | Refills: 0 | Status: SHIPPED | OUTPATIENT
Start: 2025-02-22 | End: 2025-02-28

## 2025-02-22 ASSESSMENT — ENCOUNTER SYMPTOMS
SWOLLEN GLANDS: 0
CONSTITUTIONAL NEGATIVE: 1
VOMITING: 0
COUGH: 1
ABDOMINAL PAIN: 0
SHORTNESS OF BREATH: 0
HOARSE VOICE: 0
STRIDOR: 0
NECK PAIN: 0
HEADACHES: 0
TROUBLE SWALLOWING: 0
SORE THROAT: 1
COUGH: 0
HOARSE VOICE: 1
DIARRHEA: 0

## 2025-02-22 NOTE — PROGRESS NOTES
Subjective   Patient ID: Olga Christopher is a 34 y.o. female. They present today with a chief complaint of Sore Throat (Just this morning exposed at work).    History of Present Illness  Patient has a sore throat today.  Her throat felt slightly tingly yesterday and she woke up with pain today.  She works at a  and was exposed to strep 2 days ago.  Denies any other symptoms      Sore Throat   This is a new problem. The current episode started today. The problem has been gradually worsening. The pain is worse on the right side. There has been no fever. The fever has been present for Less than 1 day. The pain is at a severity of 3/10. The pain is mild. Pertinent negatives include no abdominal pain, congestion, coughing, diarrhea, drooling, ear discharge, ear pain, headaches, hoarse voice, plugged ear sensation, neck pain, shortness of breath, stridor, swollen glands, trouble swallowing or vomiting. She has had exposure to strep.       Past Medical History  Allergies as of 2025    (No Known Allergies)       (Not in a hospital admission)       Past Medical History:   Diagnosis Date    Complete or unspecified spontaneous  without complication 2020    , spontaneous complete    Complete spontaneous  ([facility]) 2024    Dizziness     Encounter for  screening, unspecified 2021    Encounter for  screening    Encounter for pregnancy test, result positive ([facility]) 2021    Pregnancy test positive    Encounter for screening for infections with a predominantly sexual mode of transmission 02/15/2021    Screening for STD (sexually transmitted disease)    Encounter for supervision of normal pregnancy, unspecified, third trimester 2021    Third trimester pregnancy    Encounter for supervision of other normal pregnancy, first trimester 2021    Multigravida in first trimester    Encounter for supervision of other normal pregnancy, second trimester  06/10/2021    Multigravida in second trimester    Encounter for supervision of other normal pregnancy, third trimester 2021    Multigravida in third trimester    Maternal care for other known or suspected poor fetal growth, unspecified trimester, not applicable or unspecified 2021    SGA (small for gestational age), fetal, affecting care of mother, antepartum    [place] small for gestational age, unspecified weight ([facility]) 2021    SGA (small for gestational age)    Other specified abnormal findings of blood chemistry 2020    Abnormal thyroid screen (blood)    Other specified pregnancy related conditions, unspecified trimester ([facility]) 2021    Pain of round ligament during pregnancy    Pelvic and perineal pain 02/15/2021    Pelvic pain in female    Personal history of other complications of pregnancy, childbirth and the puerperium 2021    H/O: 1 miscarriage    Personal history of other diseases of the female genital tract 02/15/2021    History of vaginal discharge    Personal history of other diseases of the musculoskeletal system and connective tissue     History of scoliosis    Pregnancy with inconclusive fetal viability, not applicable or unspecified 2021    Encounter to determine fetal viability of pregnancy       Past Surgical History:   Procedure Laterality Date    SALPINGECTOMY      SPINE SURGERY  2017    Scoliosis        reports that she has never smoked. She has never used smokeless tobacco. She reports that she does not currently use alcohol. She reports that she does not currently use drugs.    Review of Systems  Review of Systems   Constitutional: Negative.    HENT:  Positive for sore throat. Negative for congestion, drooling, ear discharge, ear pain, hoarse voice and trouble swallowing.    Respiratory:  Negative for cough, shortness of breath and stridor.    Gastrointestinal:  Negative for abdominal pain, diarrhea and vomiting.   Musculoskeletal:   Negative for neck pain.   Neurological:  Negative for headaches.                                  Objective    Vitals:    02/22/25 0832   BP: 121/82   Pulse: 70   Temp: 36.7 °C (98.1 °F)   SpO2: 100%     No LMP recorded.    Physical Exam  Vitals and nursing note reviewed.   Constitutional:       Comments: Alert oriented well-nourished well-developed pleasant and cooperative 34-year-old female in no acute distress   HENT:      Head: Normocephalic and atraumatic.      Right Ear: Tympanic membrane, ear canal and external ear normal.      Left Ear: Tympanic membrane, ear canal and external ear normal.      Nose: Nose normal.      Mouth/Throat:      Mouth: Mucous membranes are moist.      Pharynx: Oropharynx is clear. No oropharyngeal exudate or posterior oropharyngeal erythema.   Eyes:      Extraocular Movements: Extraocular movements intact.      Conjunctiva/sclera: Conjunctivae normal.   Cardiovascular:      Rate and Rhythm: Normal rate and regular rhythm.   Pulmonary:      Effort: Pulmonary effort is normal. No respiratory distress.      Breath sounds: Normal breath sounds.   Musculoskeletal:      Cervical back: Normal range of motion and neck supple.   Lymphadenopathy:      Cervical: No cervical adenopathy.   Skin:     General: Skin is warm and dry.   Neurological:      General: No focal deficit present.      Mental Status: She is oriented to person, place, and time.   Psychiatric:         Mood and Affect: Mood normal.         Behavior: Behavior normal.         Procedures    Point of Care Test & Imaging Results from this visit  Results for orders placed or performed in visit on 02/22/25   POCT rapid strep A manually resulted   Result Value Ref Range    POC Rapid Strep Negative Negative      No results found.    Diagnostic study results (if any) were reviewed by Paige Lambert PA-C.    Assessment/Plan   Allergies, medications, history, and pertinent labs/EKGs/Imaging reviewed by Paige Lambert PA-C.      Medical Decision Making  History and physical examination are consistent with pharyngitis.  Her quick strep was negative at the urgent care today.  Will obtain a PCR test.  She had exposure to strep 2 days ago.  She endorses having MyChart on her phone, and if she does not hear from us within about 24 to 48 hours will check her MyChart for her strep PCR test results.  A prescription will be sent to her pharmacy for amoxicillin that she agrees to fill and take if she has a positive strep test.  Will treat her symptoms with steroids.  If her symptoms worsen especially if she develops difficulty swallowing voice changes high fevers or other concerning symptoms she will go to the emergency department    Orders and Diagnoses  Diagnoses and all orders for this visit:  Sore throat  -     POCT rapid strep A manually resulted      Medical Admin Record      Patient disposition: Home    Electronically signed by Paige Lambert PA-C  9:05 AM

## 2025-02-23 LAB — S PYO DNA THROAT QL NAA+PROBE: NOT DETECTED

## 2025-08-15 ENCOUNTER — APPOINTMENT (OUTPATIENT)
Dept: PRIMARY CARE | Facility: CLINIC | Age: 35
End: 2025-08-15
Payer: COMMERCIAL

## 2025-08-20 ENCOUNTER — APPOINTMENT (OUTPATIENT)
Dept: PRIMARY CARE | Facility: CLINIC | Age: 35
End: 2025-08-20
Payer: COMMERCIAL

## 2025-08-20 VITALS
WEIGHT: 174 LBS | HEIGHT: 66 IN | SYSTOLIC BLOOD PRESSURE: 114 MMHG | RESPIRATION RATE: 16 BRPM | DIASTOLIC BLOOD PRESSURE: 66 MMHG | HEART RATE: 79 BPM | BODY MASS INDEX: 27.97 KG/M2 | OXYGEN SATURATION: 98 %

## 2025-08-20 DIAGNOSIS — H81.93 VESTIBULAR DYSFUNCTION OF BOTH EARS: Primary | ICD-10-CM

## 2025-08-20 PROCEDURE — 1036F TOBACCO NON-USER: CPT | Performed by: FAMILY MEDICINE

## 2025-08-20 PROCEDURE — 99213 OFFICE O/P EST LOW 20 MIN: CPT | Performed by: FAMILY MEDICINE

## 2025-08-20 PROCEDURE — 3008F BODY MASS INDEX DOCD: CPT | Performed by: FAMILY MEDICINE

## 2025-08-20 ASSESSMENT — ANXIETY QUESTIONNAIRES
1. FEELING NERVOUS, ANXIOUS, OR ON EDGE: SEVERAL DAYS
3. WORRYING TOO MUCH ABOUT DIFFERENT THINGS: NOT AT ALL
GAD7 TOTAL SCORE: 2
5. BEING SO RESTLESS THAT IT IS HARD TO SIT STILL: NOT AT ALL
7. FEELING AFRAID AS IF SOMETHING AWFUL MIGHT HAPPEN: NOT AT ALL
4. TROUBLE RELAXING: NOT AT ALL
2. NOT BEING ABLE TO STOP OR CONTROL WORRYING: NOT AT ALL
6. BECOMING EASILY ANNOYED OR IRRITABLE: SEVERAL DAYS
IF YOU CHECKED OFF ANY PROBLEMS ON THIS QUESTIONNAIRE, HOW DIFFICULT HAVE THESE PROBLEMS MADE IT FOR YOU TO DO YOUR WORK, TAKE CARE OF THINGS AT HOME, OR GET ALONG WITH OTHER PEOPLE: NOT DIFFICULT AT ALL

## 2025-08-20 ASSESSMENT — PATIENT HEALTH QUESTIONNAIRE - PHQ9
SUM OF ALL RESPONSES TO PHQ9 QUESTIONS 1 AND 2: 0
1. LITTLE INTEREST OR PLEASURE IN DOING THINGS: NOT AT ALL
2. FEELING DOWN, DEPRESSED OR HOPELESS: NOT AT ALL

## 2025-12-15 ENCOUNTER — APPOINTMENT (OUTPATIENT)
Dept: OBSTETRICS AND GYNECOLOGY | Facility: CLINIC | Age: 35
End: 2025-12-15
Payer: COMMERCIAL

## (undated) DEVICE — COVER HANDLE LIGHT, STERIS, BLUE, STERILE

## (undated) DEVICE — SUTURE, VICRYL, 4-0, 18 IN, PS2, UNDYED

## (undated) DEVICE — TROCAR, KII OPTICAL SEPARATOR, 8MM X 100MM,  Z-THREAD

## (undated) DEVICE — SUTURE, VICRYL, 0, 27 IN, UR-6, VIOLET

## (undated) DEVICE — STRAP, VELCRO, BODY, 4 X 60IN, NS

## (undated) DEVICE — Device

## (undated) DEVICE — SOLUTION, IRRIGATION, SODIUM CHLORIDE 0.9%, 1000 ML, POUR BOTTLE

## (undated) DEVICE — SUTURE, VICRYL 0, 36 IN, CT-1, VIOLET

## (undated) DEVICE — DRAPE PACK, CESAREAN SECTION, CUSTOM, UHC

## (undated) DEVICE — SUTURE, VICRYL, 4-0, 27 IN, KS, UNDYED

## (undated) DEVICE — GLOVE, PROTEXIS PI CLASSIC, SZ-6.5, PF, LF

## (undated) DEVICE — SWABSTICK, PREP, IODOPHOR, PVP, 8 IN

## (undated) DEVICE — DRAPE, SHEET, ENDOSCOPY, GENERAL, FENESTRATED, ARMBOARD COVER, 98 X 123.5 IN, DISPOSABLE, LF, STERILE

## (undated) DEVICE — CATHETER TRAY, SURESTEP, 16FR, URINE METER W/STATLOCK

## (undated) DEVICE — BANDAGE, ADHESIVE, FLEXIBLE FABRIC, 1 X 3

## (undated) DEVICE — SLEEVE, KII, Z-THREAD, 5X100CM

## (undated) DEVICE — GLOVE, SURGICAL, PROTEXIS PI , 7.5, PF, LF

## (undated) DEVICE — TOWEL PACK, STERILE, 4/PACK, BLUE

## (undated) DEVICE — DRESSING, GAUZE, SPONGE, 8 PLY, CURITY, 2 X 2 IN, STERILE

## (undated) DEVICE — DRAPE, LEGGINGS, 48 X 31 IN, STERILE, LF

## (undated) DEVICE — SYRINGE, 10 CC, LUER LOCK

## (undated) DEVICE — CATHETER, URETHRAL, FOLEY, 2 WAY, 16 FR, 5 CC, SILICONE

## (undated) DEVICE — NEEDLE, INSUFFLATION, 13GAX120MM, DISP

## (undated) DEVICE — DRESSING, MEPILEX BORDER, POST-OP AG, 4 X 10 IN

## (undated) DEVICE — GOWN, SURGICAL, SMARTGOWN, XLARGE, STERILE

## (undated) DEVICE — DRESSING, GAUZE, VERSALON, 4 PLY, 4 X 4 IN, STERILE

## (undated) DEVICE — APPLICATOR, CHLORAPREP, W/ORANGE TINT, 26ML

## (undated) DEVICE — STRIP, SKIN CLOSURE, STERI STRIP, REINFORCED, 0.5 X 4 IN

## (undated) DEVICE — SUTURE, VICRYL, 0, 36 IN, CT, UNDYED

## (undated) DEVICE — TROCAR, KII OPTICAL BLADELESS 5MM Z THREAD 100MM LNGTH

## (undated) DEVICE — LIGASURE, V SEALER/DIVIDER  5MM BLUNT TIP

## (undated) DEVICE — DRESSING, TRANSPARENT, TEGADERM, 2-3/8 X 2-3/4 IN